# Patient Record
Sex: FEMALE | Race: WHITE | NOT HISPANIC OR LATINO | Employment: OTHER | URBAN - METROPOLITAN AREA
[De-identification: names, ages, dates, MRNs, and addresses within clinical notes are randomized per-mention and may not be internally consistent; named-entity substitution may affect disease eponyms.]

---

## 2017-04-26 ENCOUNTER — APPOINTMENT (OUTPATIENT)
Dept: WOMENS IMAGING | Facility: HOSPITAL | Age: 52
End: 2017-04-26

## 2017-04-26 PROCEDURE — 76641 ULTRASOUND BREAST COMPLETE: CPT | Performed by: RADIOLOGY

## 2017-04-26 PROCEDURE — 19001 PUNCTURE ASPIR CYST BRST EA: CPT | Performed by: RADIOLOGY

## 2017-04-26 PROCEDURE — 77067 SCR MAMMO BI INCL CAD: CPT | Performed by: RADIOLOGY

## 2017-04-26 PROCEDURE — 77063 BREAST TOMOSYNTHESIS BI: CPT | Performed by: RADIOLOGY

## 2017-04-26 PROCEDURE — 19000 PUNCTURE ASPIR CYST BREAST: CPT | Performed by: RADIOLOGY

## 2017-04-26 PROCEDURE — 76942 ECHO GUIDE FOR BIOPSY: CPT | Performed by: RADIOLOGY

## 2017-04-26 PROCEDURE — G0202 SCR MAMMO BI INCL CAD: HCPCS | Performed by: RADIOLOGY

## 2017-04-26 PROCEDURE — MDREVIEWSP: Performed by: RADIOLOGY

## 2018-02-02 ENCOUNTER — OFFICE VISIT (OUTPATIENT)
Dept: INTERNAL MEDICINE | Facility: CLINIC | Age: 53
End: 2018-02-02

## 2018-02-02 VITALS
WEIGHT: 156.4 LBS | BODY MASS INDEX: 26.06 KG/M2 | DIASTOLIC BLOOD PRESSURE: 80 MMHG | SYSTOLIC BLOOD PRESSURE: 120 MMHG | OXYGEN SATURATION: 99 % | RESPIRATION RATE: 16 BRPM | TEMPERATURE: 98.1 F | HEIGHT: 65 IN | HEART RATE: 113 BPM

## 2018-02-02 DIAGNOSIS — G43.009 MIGRAINE WITHOUT AURA AND WITHOUT STATUS MIGRAINOSUS, NOT INTRACTABLE: ICD-10-CM

## 2018-02-02 DIAGNOSIS — R42 VERTIGO: Primary | ICD-10-CM

## 2018-02-02 PROBLEM — G44.59 OTHER COMPLICATED HEADACHE SYNDROME: Status: ACTIVE | Noted: 2018-02-02

## 2018-02-02 PROCEDURE — 99213 OFFICE O/P EST LOW 20 MIN: CPT | Performed by: NURSE PRACTITIONER

## 2018-02-02 RX ORDER — MECLIZINE HYDROCHLORIDE 25 MG/1
25 TABLET ORAL 3 TIMES DAILY PRN
Status: DISCONTINUED | OUTPATIENT
Start: 2018-02-02 | End: 2018-02-02

## 2018-02-02 RX ORDER — MECLIZINE HYDROCHLORIDE 25 MG/1
25 TABLET ORAL 3 TIMES DAILY PRN
Qty: 30 TABLET | Refills: 1 | Status: SHIPPED | OUTPATIENT
Start: 2018-02-02 | End: 2018-04-26 | Stop reason: SDUPTHER

## 2018-02-02 NOTE — PROGRESS NOTES
Subjective   Patient ID: Saray Green is a 52 y.o. female presents with   Chief Complaint   Patient presents with   • Dizziness     Pt has been experincing dizzy spells when she is laying down or moving head to bend down       HPI Comments: Cc: freq episodes of vertigo followed by frontal ha x 6-8 weeks. She has seen dr mar in the past for migraines and would like f/u appt. Her father  from cerebral aneurysm    Dizziness   Associated symptoms include headaches (frontal and throbbing relieved by IBU. duration 30min precipitated by vertigo). Pertinent negatives include no abdominal pain, fatigue, joint swelling, numbness, rash or sore throat.       Allergies   Allergen Reactions   • Macrobid [Nitrofurantoin] Rash       The following portions of the patient's history were reviewed and updated as appropriate: allergies, current medications, past family history, past medical history, past social history, past surgical history and problem list.      Review of Systems   Constitutional: Negative for activity change, fatigue and unexpected weight change.   HENT: Negative for dental problem, ear pain, nosebleeds and sore throat.    Eyes: Negative for pain and discharge.   Respiratory: Negative for chest tightness, shortness of breath and wheezing.    Gastrointestinal: Negative for abdominal pain and blood in stool.   Endocrine: Negative.    Genitourinary: Negative for difficulty urinating and hematuria.   Musculoskeletal: Negative for joint swelling.   Skin: Negative for color change, pallor, rash and wound.   Allergic/Immunologic: Negative.    Neurological: Positive for dizziness and headaches (frontal and throbbing relieved by IBU. duration 30min precipitated by vertigo). Negative for tremors, seizures, syncope, facial asymmetry, speech difficulty and numbness.   Hematological: Negative for adenopathy.   Psychiatric/Behavioral: Negative for agitation, confusion, sleep disturbance and suicidal ideas.        Objective     Vitals:    02/02/18 0859   BP: 120/80   Pulse: 113   Resp: 16   Temp: 98.1 °F (36.7 °C)   SpO2: 99%         Physical Exam   Constitutional: She is oriented to person, place, and time. She appears well-developed and well-nourished. No distress.   HENT:   Head: Normocephalic and atraumatic.   Eyes: Conjunctivae and EOM are normal. Pupils are equal, round, and reactive to light.   Neck: Normal range of motion. Neck supple.   Cardiovascular: Normal rate, regular rhythm, normal heart sounds and intact distal pulses.    Pulmonary/Chest: Effort normal and breath sounds normal.   Abdominal: Soft. Bowel sounds are normal.   Musculoskeletal: Normal range of motion.   Neurological: She is alert and oriented to person, place, and time. She has normal reflexes. No cranial nerve deficit. Coordination normal.   Speech clear and appropriate response and speech. Rhomberg neg. Coordination is intact   Skin: Skin is warm and dry.   Psychiatric: She has a normal mood and affect. Her behavior is normal. Judgment and thought content normal.   Nursing note and vitals reviewed.        Saray Cadet was seen today for dizziness.    Diagnoses and all orders for this visit:    Vertigo  -     meclizine (ANTIVERT) tablet 25 mg; Take 1 tablet by mouth 3 (Three) Times a Day As Needed for dizziness.  -     Ambulatory Referral to Neurology    Migraine without aura and without status migrainosus, not intractable  -     Ambulatory Referral to Neurology        Follow-up with Primary Care Physician in 24-48 hours if these symptoms worsen or fail to improve as anticipated.

## 2018-02-12 DIAGNOSIS — G43.009 MIGRAINE WITHOUT AURA AND WITHOUT STATUS MIGRAINOSUS, NOT INTRACTABLE: ICD-10-CM

## 2018-02-12 DIAGNOSIS — R42 VERTIGO: Primary | ICD-10-CM

## 2018-02-20 ENCOUNTER — HOSPITAL ENCOUNTER (OUTPATIENT)
Dept: MRI IMAGING | Facility: HOSPITAL | Age: 53
Discharge: HOME | End: 2018-02-20
Admitting: NURSE PRACTITIONER

## 2018-02-20 DIAGNOSIS — R42 VERTIGO: ICD-10-CM

## 2018-02-20 DIAGNOSIS — G43.009 MIGRAINE WITHOUT AURA AND WITHOUT STATUS MIGRAINOSUS, NOT INTRACTABLE: ICD-10-CM

## 2018-02-20 PROCEDURE — A9577 INJ MULTIHANCE: HCPCS | Performed by: NURSE PRACTITIONER

## 2018-02-20 PROCEDURE — 70553 MRI BRAIN STEM W/O & W/DYE: CPT

## 2018-02-20 PROCEDURE — 0 GADOBENATE DIMEGLUMINE 529 MG/ML SOLUTION: Performed by: NURSE PRACTITIONER

## 2018-02-20 PROCEDURE — 82565 ASSAY OF CREATININE: CPT

## 2018-02-20 RX ADMIN — GADOBENATE DIMEGLUMINE 13 ML: 529 INJECTION, SOLUTION INTRAVENOUS at 09:15

## 2018-02-21 LAB — CREAT BLDA-MCNC: 0.9 MG/DL (ref 0.6–1.3)

## 2018-02-22 ENCOUNTER — TELEPHONE (OUTPATIENT)
Dept: INTERNAL MEDICINE | Facility: CLINIC | Age: 53
End: 2018-02-22

## 2018-02-22 DIAGNOSIS — R41.0 CONFUSION: Primary | ICD-10-CM

## 2018-02-27 ENCOUNTER — RESULTS ENCOUNTER (OUTPATIENT)
Dept: INTERNAL MEDICINE | Facility: CLINIC | Age: 53
End: 2018-02-27

## 2018-02-27 DIAGNOSIS — R41.0 CONFUSION: ICD-10-CM

## 2018-03-17 LAB
FOLATE SERPL-MCNC: 5.2 NG/ML
T4 SERPL-MCNC: 7.3 UG/DL (ref 4.5–12)
TSH SERPL DL<=0.005 MIU/L-ACNC: 1.56 UIU/ML (ref 0.45–4.5)
VIT B12 SERPL-MCNC: 220 PG/ML (ref 232–1245)

## 2018-03-26 ENCOUNTER — OFFICE VISIT (OUTPATIENT)
Dept: NEUROLOGY | Facility: CLINIC | Age: 53
End: 2018-03-26

## 2018-03-26 VITALS
DIASTOLIC BLOOD PRESSURE: 72 MMHG | HEIGHT: 66 IN | SYSTOLIC BLOOD PRESSURE: 117 MMHG | OXYGEN SATURATION: 98 % | HEART RATE: 100 BPM | WEIGHT: 158 LBS | BODY MASS INDEX: 25.39 KG/M2

## 2018-03-26 DIAGNOSIS — E53.8 VITAMIN B12 DEFICIENCY: ICD-10-CM

## 2018-03-26 DIAGNOSIS — R42 VERTIGO: Primary | ICD-10-CM

## 2018-03-26 DIAGNOSIS — Z82.49 FAMILY HISTORY OF ANEURYSM: ICD-10-CM

## 2018-03-26 DIAGNOSIS — G43.009 MIGRAINE WITHOUT AURA AND WITHOUT STATUS MIGRAINOSUS, NOT INTRACTABLE: ICD-10-CM

## 2018-03-26 PROCEDURE — 99204 OFFICE O/P NEW MOD 45 MIN: CPT | Performed by: PSYCHIATRY & NEUROLOGY

## 2018-03-26 NOTE — PROGRESS NOTES
Subjective:     Patient ID: Saray Green is a 52 y.o. female.    History of Present Illness  {Common ambulatory SmartLinks:48095}    Review of Systems     Objective:    Neurologic Exam    Physical Exam    Assessment/Plan:     {Assess/PlanSmartLinks:79911}

## 2018-03-26 NOTE — PROGRESS NOTES
Subjective:     Patient ID: Saray Green is a 52 y.o. female.    History of Present Illness  The following portions of the patient's history were reviewed and updated as appropriate: allergies, current medications, past family history, past medical history, past social history, past surgical history and problem list.  Vertigo: Dr. Schumacher, : Dizziness with lying down or moving her head.  Frequent vertigo followed by a frontal headache for 6-8 weeks.  Brain MRI was unremarkable and I reviewed the x-ray.  Meclizine has dramatically helped the symptoms over time.  The symptoms probably began [place]    Family history of aneurysm: Father  of cerebral aneurysm.  Care of him many years ago.  She has no other family history of this disorder.    Speech concerns: Also since [place] she notes occasionally mixing up words.  For example might say [place]  , might say 'comfortable' instead of 'expensive'.  This seems to occur every day.  There is no aphasia.    No other neurologic symptoms such as diplopia or loss weakness numbness loss of balance.  She has a low B12 level    MRI brain review-2018-normal study with and without contrast.    MRI  Of spine review from : Cervical scan and THoracic  showed no stenosis with normal cord, small protrusions at T8-9 and T9-10.  Cervical normal.  The lumbar MRI same date showed disc bulge at L4-5 with an annular tear, small protrusion at L5-S1 and mild arthritic phenomena.  Very little change compared to  except that the bulge was more prominent    Lab review from 2018 revealed a normal TSH and folate but low B12 at 220  Review of Systems   Constitutional: Negative.    HENT: Negative.    Eyes: Negative.    Respiratory: Negative.    Cardiovascular: Negative.    Gastrointestinal: Negative.    Endocrine: Negative.    Genitourinary: Negative.    Musculoskeletal: Positive for back pain.   Skin: Negative.    Allergic/Immunologic: Negative.     Neurological: Positive for dizziness and speech difficulty. Negative for tremors, seizures, syncope, facial asymmetry, weakness, light-headedness, numbness and headaches.   Hematological: Negative.    Psychiatric/Behavioral: Negative for agitation, behavioral problems, confusion, decreased concentration, dysphoric mood, hallucinations, self-injury, sleep disturbance and suicidal ideas. The patient is not nervous/anxious and is not hyperactive.         Objective:    Neurologic Exam  This patient was well-developed, well-nourished and in no acute distress.      GAIT:  Gait, station, heel, toe and tandem walk were normal.  There was no drift of the arms.  Romberg’s sign was not present.      VASCULAR: The carotid arteries had normal upstroke to palpation without bruits.  The vertebral arteries were without bruits.  The radial pulses were equal and without delay. The extremities were without cyanosis, clubbing or edema.    MENTAL STATUS: This patient was alert and oriented to person, place, time and situation.  Recent and remote memory -  intact.  Attention span, fund of knowledge and concentration were normal.  Comprehension, naming, reading, repetition, and language were normal.  Fund of knowledge was intact.    CRANIAL NERVES:  Olfaction-not tested.  Visual fields full in all quadrants to confrontation.  The pupils were equally round and reactive to light at  6  mm.  There was no evidence of a Dale Yousuf pupil.  Funduscopic exam revealed no papilledema, hemorrhage or exudate.  The gaze was conjugate. The vertical and horizontal eye movements were full without nystagmus.  There was no facial weakness.  There was no facial sensory loss to pinprick bilaterally.  Hearing was normal for light finger rub and casual speech.  Speech is normal without dysarthria.  The neck is supple and strength is normal in rotation, flexion and extension.  Tongue and palate movements are normal.    MOTOR UPPER EXTREMTIES:   Bilaterally  the deltoid, biceps, triceps, wrist extensors, intrinsic hand muscles, and  were grade 5 and normal.  Bulk, tone and strength of these muscles were normal without abnormal movements.    MOTOR LOWER EXTREMITIES: Bilaterally the  grade 5 & normal. Bulk, tone and strength of these muscles were normal without abnormal movements.  DEEP TENDON REFLEXES:  Bilateral biceps, triceps, and brachioradialis reflexes were grade 1 symmetric.  Bilateral knee, hamstrings and ankle reflexes were grade 2 and symmetric.  The plantar responses were downgoing.  Ankle clonus was not present.    CEREBELLAR:  Finger to nose, rapid finger opposition, and heel-to-shin tests were performed normally, bilaterally.    MUSCULOSKELETAL:  Normal range of motion of the neck is noted.  There was no back pain with straight leg raise.  Internal and external rotation of the hips was normal.     SENSORY: There was normal upper and lower extremity sensation to vibration, proprioception, and pinprick.  HIGHER CORTICAL FUNCTION: There were no aphasic or apraxic errors.  Visual fields were intact to confrontation.      Physical Exam   Constitutional: She appears well-developed.   HENT:   Head: Normocephalic.   Neck: Normal range of motion.   Cardiovascular: Normal rate.    Pulmonary/Chest: Effort normal.   Psychiatric: She has a normal mood and affect. Her behavior is normal. Judgment and thought content normal.   Nursing note and vitals reviewed.      Assessment/Plan:       Problems Addressed this Visit        Unprioritized    Vertigo - Primary    Relevant Orders    MRI Angiogram Neck With & Without Contrast    Migraine without aura and without status migrainosus, not intractable    Family history of aneurysm    Relevant Orders    MRI Angiogram Head With & Without Contrast      Other Visit Diagnoses    None.        Vertigo likely is due to BPPV  .  I gave her a handout concerning this disorder and on how to do the Epley exercises.  I suspect the problem  will resolve on its own.    Family history of aneurysm-her father  of this disorder.  She needs the MRA study of the head which will be ordered.      Speech difficulty-no symptoms noted today.  We will add the MRA of the carotids to rule out severe stenosis    B12 deficiency-she will contact Dr. Elizalde referred.  I explained that she may have deficiency of intrinsic factor.  Low B12 can cause a variety of neurologic and hematologic symptoms.  She has not had a CBC or MMA level.

## 2018-03-26 NOTE — PATIENT INSTRUCTIONS
How to Perform the Epley Maneuver  The Epley maneuver is an exercise that relieves symptoms of vertigo. Vertigo is the feeling that you or your surroundings are moving when they are not. When you feel vertigo, you may feel like the room is spinning and have trouble walking. Dizziness is a little different than vertigo. When you are dizzy, you may feel unsteady or light-headed.  You can do this maneuver at home whenever you have symptoms of vertigo. You can do it up to 3 times a day until your symptoms go away.  Even though the Epley maneuver may relieve your vertigo for a few weeks, it is possible that your symptoms will return. This maneuver relieves vertigo, but it does not relieve dizziness.  What are the risks?  If it is done correctly, the Epley maneuver is considered safe. Sometimes it can lead to dizziness or nausea that goes away after a short time. If you develop other symptoms, such as changes in vision, weakness, or numbness, stop doing the maneuver and call your health care provider.  How to perform the Epley maneuver  1. Sit on the edge of a bed or table with your back straight and your legs extended or hanging over the edge of the bed or table.  2. Turn your head half-way toward the affected ear or side.  3. Lie backward quickly with your head turned until you are lying flat on your back. You may want to position a pillow under your shoulders.  4. Hold this position for 30 seconds. You may experience an attack of vertigo. This is normal.  5. Turn your head to the opposite direction until your unaffected ear is facing the floor.  6. Hold this position for 30 seconds. You may experience an attack of vertigo. This is normal. Hold this position until the vertigo stops.  7. Turn your whole body to the same side as your head. Hold for another 30 seconds.  8. Sit back up.  You can repeat this exercise up to 3 times a day.  Follow these instructions at home:  · After doing the Epley maneuver, you can return to  your normal activities.  · Ask your health care provider if there is anything you should do at home to prevent vertigo. He or she may recommend that you:  ¨ Keep your head raised (elevated) with two or more pillows while you sleep.  ¨ Do not sleep on the side of your affected ear.  ¨ Get up slowly from bed.  ¨ Avoid sudden movements during the day.  ¨ Avoid extreme head movement, like looking up or bending over.  Contact a health care provider if:  · Your vertigo gets worse.  · You have other symptoms, including:  ¨ Nausea.  ¨ Vomiting.  ¨ Headache.  Get help right away if:  · You have vision changes.  · You have a severe or worsening headache or neck pain.  · You cannot stop vomiting.  · You have new numbness or weakness in any part of your body.  Summary  · Vertigo is the feeling that you or your surroundings are moving when they are not.  · The Epley maneuver is an exercise that relieves symptoms of vertigo.  · If the Epley maneuver is done correctly, it is considered safe. You can do it up to 3 times a day.  This information is not intended to replace advice given to you by your health care provider. Make sure you discuss any questions you have with your health care provider.  Document Released: 12/23/2014 Document Revised: 11/07/2017 Document Reviewed: 11/07/2017  Elsevier Interactive Patient Education © 2017 [employer]  Benign Positional Vertigo  Vertigo is the feeling that you or your surroundings are moving when they are not. Benign positional vertigo is the most common form of vertigo. The cause of this condition is not serious (is benign). This condition is triggered by certain movements and positions (is positional). This condition can be dangerous if it occurs while you are doing something that could endanger you or others, such as driving.  What are the causes?  In many cases, the cause of this condition is not known. It may be caused by a disturbance in an area of the inner ear that helps your brain  to sense movement and balance. This disturbance can be caused by a viral infection (labyrinthitis), head injury, or repetitive motion.  What increases the risk?  This condition is more likely to develop in:  · Women.  · People who are 50 years of age or older.  What are the signs or symptoms?  Symptoms of this condition usually happen when you move your head or your eyes in different directions. Symptoms may start suddenly, and they usually last for less than a minute. Symptoms may include:  · Loss of balance and falling.  · Feeling like you are spinning or moving.  · Feeling like your surroundings are spinning or moving.  · Nausea and vomiting.  · Blurred vision.  · Dizziness.  · Involuntary eye movement (nystagmus).  Symptoms can be mild and cause only slight annoyance, or they can be severe and interfere with daily life. Episodes of benign positional vertigo may return (recur) over time, and they may be triggered by certain movements. Symptoms may improve over time.  How is this diagnosed?  This condition is usually diagnosed by medical history and a physical exam of the head, neck, and ears. You may be referred to a health care provider who specializes in ear, nose, and throat (ENT) problems (otolaryngologist) or a provider who specializes in disorders of the nervous system (neurologist). You may have additional testing, including:  · MRI.  · A CT scan.  · Eye movement tests. Your health care provider may ask you to change positions quickly while he or she watches you for symptoms of benign positional vertigo, such as nystagmus. Eye movement may be tested with an electronystagmogram (ENG), caloric stimulation, the [place]-Hallpike test, or the roll test.  · An electroencephalogram (EEG). This records electrical activity in your brain.  · Hearing tests.  How is this treated?  Usually, your health care provider will treat this by moving your head in specific positions to adjust your inner ear back to normal. Surgery  may be needed in severe cases, but this is rare. In some cases, benign positional vertigo may resolve on its own in 2-4 weeks.  Follow these instructions at home:  Safety   · Move slowly.Avoid sudden body or head movements.  · Avoid driving.  · Avoid operating heavy machinery.  · Avoid doing any tasks that would be dangerous to you or others if a vertigo episode would occur.  · If you have trouble walking or keeping your balance, try using a cane for stability. If you feel dizzy or unstable, sit down right away.  · Return to your normal activities as told by your health care provider. Ask your health care provider what activities are safe for you.  General instructions   · Take over-the-counter and prescription medicines only as told by your health care provider.  · Avoid certain positions or movements as told by your health care provider.  · Drink enough fluid to keep your urine clear or pale yellow.  · Keep all follow-up visits as told by your health care provider. This is important.  Contact a health care provider if:  · You have a fever.  · Your condition gets worse or you develop new symptoms.  · Your family or friends notice any behavioral changes.  · Your nausea or vomiting gets worse.  · You have numbness or a “pins and needles” sensation.  Get help right away if:  · You have difficulty speaking or moving.  · You are always dizzy.  · You faint.  · You develop severe headaches.  · You have weakness in your legs or arms.  · You have changes in your hearing or vision.  · You develop a stiff neck.  · You develop sensitivity to light.  This information is not intended to replace advice given to you by your health care provider. Make sure you discuss any questions you have with your health care provider.  Document Released: 09/25/2007 Document Revised: 05/25/2017 Document Reviewed: 04/11/2016  [employer] Interactive Patient Education © 2017 [employer] Inc.

## 2018-04-09 ENCOUNTER — HOSPITAL ENCOUNTER (OUTPATIENT)
Dept: MRI IMAGING | Facility: HOSPITAL | Age: 53
Discharge: HOME | End: 2018-04-09
Attending: PSYCHIATRY & NEUROLOGY

## 2018-04-09 ENCOUNTER — HOSPITAL ENCOUNTER (OUTPATIENT)
Dept: MRI IMAGING | Facility: HOSPITAL | Age: 53
Discharge: HOME | End: 2018-04-09
Attending: PSYCHIATRY & NEUROLOGY | Admitting: PSYCHIATRY & NEUROLOGY

## 2018-04-09 DIAGNOSIS — Z82.49 FAMILY HISTORY OF ANEURYSM: ICD-10-CM

## 2018-04-09 DIAGNOSIS — R42 VERTIGO: ICD-10-CM

## 2018-04-09 PROCEDURE — 70544 MR ANGIOGRAPHY HEAD W/O DYE: CPT

## 2018-04-09 PROCEDURE — A9577 INJ MULTIHANCE: HCPCS | Performed by: PSYCHIATRY & NEUROLOGY

## 2018-04-09 PROCEDURE — 70549 MR ANGIOGRAPH NECK W/O&W/DYE: CPT

## 2018-04-09 PROCEDURE — 0 GADOBENATE DIMEGLUMINE 529 MG/ML SOLUTION: Performed by: PSYCHIATRY & NEUROLOGY

## 2018-04-09 PROCEDURE — 82565 ASSAY OF CREATININE: CPT

## 2018-04-09 RX ADMIN — GADOBENATE DIMEGLUMINE 15 ML: 529 INJECTION, SOLUTION INTRAVENOUS at 15:45

## 2018-04-10 LAB — CREAT BLDA-MCNC: 0.9 MG/DL (ref 0.6–1.3)

## 2018-04-26 ENCOUNTER — OFFICE VISIT (OUTPATIENT)
Dept: INTERNAL MEDICINE | Facility: CLINIC | Age: 53
End: 2018-04-26

## 2018-04-26 VITALS
OXYGEN SATURATION: 99 % | DIASTOLIC BLOOD PRESSURE: 60 MMHG | HEART RATE: 70 BPM | HEIGHT: 66 IN | TEMPERATURE: 97.4 F | RESPIRATION RATE: 16 BRPM | SYSTOLIC BLOOD PRESSURE: 122 MMHG | WEIGHT: 159.6 LBS | BODY MASS INDEX: 25.65 KG/M2

## 2018-04-26 DIAGNOSIS — E78.01 FAMILIAL HYPERCHOLESTEROLEMIA: Primary | ICD-10-CM

## 2018-04-26 DIAGNOSIS — E87.6 DIURETIC-INDUCED HYPOKALEMIA: ICD-10-CM

## 2018-04-26 DIAGNOSIS — T50.2X5A DIURETIC-INDUCED HYPOKALEMIA: ICD-10-CM

## 2018-04-26 DIAGNOSIS — R53.82 CHRONIC FATIGUE: ICD-10-CM

## 2018-04-26 DIAGNOSIS — R42 VERTIGO: ICD-10-CM

## 2018-04-26 DIAGNOSIS — E53.8 B12 DEFICIENCY: ICD-10-CM

## 2018-04-26 PROBLEM — E78.5 HYPERLIPIDEMIA: Status: ACTIVE | Noted: 2018-04-26

## 2018-04-26 PROCEDURE — 99213 OFFICE O/P EST LOW 20 MIN: CPT | Performed by: FAMILY MEDICINE

## 2018-04-26 RX ORDER — TRIAMTERENE AND HYDROCHLOROTHIAZIDE 37.5; 25 MG/1; MG/1
1 CAPSULE ORAL EVERY MORNING
Qty: 30 CAPSULE | Refills: 5 | Status: SHIPPED | OUTPATIENT
Start: 2018-04-26 | End: 2018-07-17

## 2018-04-26 RX ORDER — MECLIZINE HYDROCHLORIDE 25 MG/1
25 TABLET ORAL 3 TIMES DAILY PRN
Qty: 90 TABLET | Refills: 1 | Status: SHIPPED | OUTPATIENT
Start: 2018-04-26 | End: 2018-04-26

## 2018-04-26 RX ORDER — MECLIZINE HYDROCHLORIDE 25 MG/1
25 TABLET ORAL 3 TIMES DAILY PRN
Qty: 90 TABLET | Refills: 1 | Status: SHIPPED | OUTPATIENT
Start: 2018-04-26 | End: 2018-07-17

## 2018-04-26 NOTE — PROGRESS NOTES
CC:vertigo,B-12 defieincy    Subjective.../HPI  Patient present today with still with nvertigo-not positional  Taking extra b_12  I have reviewed the patient's medical history in detail and updated the computerized patient record.    Past Medical History:   Diagnosis Date   • Cardiomyopathy     0CT 1999   OFF MEDS AFTER RESOLVED   HOLTER MONITOR APRIL 2016   • Heart palpitations     COMES AND GOES  24 HOLTER DONE 4-13-16   NO RESULTS YET   • History of migraine    • Migraine    • Seasonal allergies    • Shoulder pain, right        Past Surgical History:   Procedure Laterality Date   • D&C HYSTEROSCOPY WITH NOVASURE ENDOMETRIAL ABLATION AND MYOSURE     • DILATATION AND CURETTAGE      COUPLE   • KNEE ARTHROSCOPY      AGE 11   • SHOULDER ACROMIOPLASTY Left     X 2   • SHOULDER ARTHROSCOPY Right 4/28/2016    Procedure: RT SHOULDER ARTHROSCOPY WITH ACROMIOPLSTY, LABRAL DEBRIDEMENT  AND DISTAL CLAVICLE EXCISION;  Surgeon: Rikki Morel MD;  Location: [facility] OR [facility] [place];  Service:        Family History   Problem Relation Age of Onset   • Hypertension Mother    • Diabetes Mother    • Cancer Mother    • Thyroid disease Mother    • Hypertension Father    • Aneurysm Father    • Stroke Paternal Grandfather    • Cancer Paternal Grandfather        Social History     Social History   • Marital status:      Spouse name: N/A   • Number of children: N/A   • Years of education: N/A     Occupational History   • Not on file.     Social History Main Topics   • Smoking status: Never Smoker   • Smokeless tobacco: Never Used   • Alcohol use Yes      Comment: SELDOM   • Drug use: No   • Sexual activity: Not on file     Other Topics Concern   • Not on file     Social History Narrative   • No narrative on file         There is no immunization history on file for this patient.    Review of Systems:   Review of Systems   Constitutional: Negative.    HENT: Negative.    Eyes: Negative.    Respiratory: Negative.    Cardiovascular: Negative.   "  Gastrointestinal: Negative.    Endocrine: Negative.    Genitourinary: Negative.    Musculoskeletal: Negative.    Skin: Negative.    Allergic/Immunologic: Negative.    Neurological: Negative.    Hematological: Negative.    Psychiatric/Behavioral: Negative.          Physical Exam   Constitutional: She is oriented to person, place, and time. She appears well-developed and well-nourished.   Cardiovascular: Normal rate, regular rhythm and normal heart sounds.    Pulmonary/Chest: Effort normal and breath sounds normal.   Neurological: She is alert and oriented to person, place, and time.   Psychiatric: She has a normal mood and affect. Her behavior is normal.   Vitals reviewed.        Vital Signs     Vitals:    04/26/18 1125   BP: 122/60   BP Location: Left arm   Patient Position: Sitting   Cuff Size: Adult   Pulse: 70   Resp: 16   Temp: 97.4 °F (36.3 °C)   TempSrc: Tympanic   SpO2: 99%   Weight: 72.4 kg (159 lb 9.6 oz)   Height: 167.6 cm (66\")          Results Review:      REVIEWED AND DISCUSSED CLINICAL RESULTS WITH PATIENT      Requested Prescriptions     Signed Prescriptions Disp Refills   • meclizine (ANTIVERT) 25 MG tablet 90 tablet 1     Sig: Take 1 tablet by mouth 3 (Three) Times a Day As Needed for dizziness.         Current Outpatient Prescriptions:   •  acetaminophen (TYLENOL) 500 MG tablet, Take 500 mg by mouth every 6 (six) hours as needed for mild pain (1-3)., Disp: , Rfl:   •  meclizine (ANTIVERT) 25 MG tablet, Take 1 tablet by mouth 3 (Three) Times a Day As Needed for dizziness., Disp: 90 tablet, Rfl: 1    Procedures          There are no diagnoses linked to this encounter.     No Follow-up on file.    Luis Fleming M.D  04/26/18  11:40 AM          "

## 2018-04-27 ENCOUNTER — APPOINTMENT (OUTPATIENT)
Dept: WOMENS IMAGING | Facility: HOSPITAL | Age: 53
End: 2018-04-27

## 2018-04-27 LAB
ALBUMIN SERPL-MCNC: 4.3 G/DL (ref 3.5–5.2)
ALBUMIN/GLOB SERPL: 1.6 G/DL
ALP SERPL-CCNC: 59 U/L (ref 39–117)
ALT SERPL-CCNC: 12 U/L (ref 1–33)
AST SERPL-CCNC: 15 U/L (ref 1–32)
BASOPHILS # BLD AUTO: 0.03 10*3/MM3 (ref 0–0.2)
BASOPHILS NFR BLD AUTO: 0.4 % (ref 0–1.5)
BILIRUB SERPL-MCNC: 0.5 MG/DL (ref 0.1–1.2)
BUN SERPL-MCNC: 17 MG/DL (ref 6–20)
BUN/CREAT SERPL: 18.5 (ref 7–25)
CALCIUM SERPL-MCNC: 9.7 MG/DL (ref 8.6–10.5)
CHLORIDE SERPL-SCNC: 101 MMOL/L (ref 98–107)
CHOLEST SERPL-MCNC: 237 MG/DL (ref 0–200)
CO2 SERPL-SCNC: 26.5 MMOL/L (ref 22–29)
CREAT SERPL-MCNC: 0.92 MG/DL (ref 0.57–1)
EOSINOPHIL # BLD AUTO: 0.16 10*3/MM3 (ref 0–0.7)
EOSINOPHIL NFR BLD AUTO: 2.2 % (ref 0.3–6.2)
ERYTHROCYTE [DISTWIDTH] IN BLOOD BY AUTOMATED COUNT: 12.5 % (ref 11.7–13)
GFR SERPLBLD CREATININE-BSD FMLA CKD-EPI: 64 ML/MIN/1.73
GFR SERPLBLD CREATININE-BSD FMLA CKD-EPI: 77 ML/MIN/1.73
GLOBULIN SER CALC-MCNC: 2.7 GM/DL
GLUCOSE SERPL-MCNC: 83 MG/DL (ref 65–99)
HCT VFR BLD AUTO: 45.9 % (ref 35.6–45.5)
HDLC SERPL-MCNC: 55 MG/DL (ref 40–60)
HGB BLD-MCNC: 15.4 G/DL (ref 11.9–15.5)
IMM GRANULOCYTES # BLD: 0.02 10*3/MM3 (ref 0–0.03)
IMM GRANULOCYTES NFR BLD: 0.3 % (ref 0–0.5)
LDLC SERPL CALC-MCNC: 154 MG/DL (ref 0–100)
LDLC/HDLC SERPL: 2.79 {RATIO}
LYMPHOCYTES # BLD AUTO: 2.53 10*3/MM3 (ref 0.9–4.8)
LYMPHOCYTES NFR BLD AUTO: 34.7 % (ref 19.6–45.3)
MCH RBC QN AUTO: 32.2 PG (ref 26.9–32)
MCHC RBC AUTO-ENTMCNC: 33.6 G/DL (ref 32.4–36.3)
MCV RBC AUTO: 96 FL (ref 80.5–98.2)
MONOCYTES # BLD AUTO: 0.58 10*3/MM3 (ref 0.2–1.2)
MONOCYTES NFR BLD AUTO: 7.9 % (ref 5–12)
NEUTROPHILS # BLD AUTO: 4 10*3/MM3 (ref 1.9–8.1)
NEUTROPHILS NFR BLD AUTO: 54.8 % (ref 42.7–76)
PLATELET # BLD AUTO: 307 10*3/MM3 (ref 140–500)
POTASSIUM SERPL-SCNC: 5 MMOL/L (ref 3.5–5.2)
PROT SERPL-MCNC: 7 G/DL (ref 6–8.5)
RBC # BLD AUTO: 4.78 10*6/MM3 (ref 3.9–5.2)
SODIUM SERPL-SCNC: 140 MMOL/L (ref 136–145)
TRIGL SERPL-MCNC: 142 MG/DL (ref 0–150)
VIT B12 SERPL-MCNC: 255 PG/ML (ref 211–946)
VLDLC SERPL CALC-MCNC: 28.4 MG/DL (ref 5–40)
WBC # BLD AUTO: 7.3 10*3/MM3 (ref 4.5–10.7)

## 2018-04-27 PROCEDURE — 77063 BREAST TOMOSYNTHESIS BI: CPT | Performed by: RADIOLOGY

## 2018-04-27 PROCEDURE — MDREVIEWSP: Performed by: RADIOLOGY

## 2018-04-27 PROCEDURE — 76641 ULTRASOUND BREAST COMPLETE: CPT | Performed by: RADIOLOGY

## 2018-04-27 PROCEDURE — 77067 SCR MAMMO BI INCL CAD: CPT | Performed by: RADIOLOGY

## 2018-05-01 ENCOUNTER — RESULTS ENCOUNTER (OUTPATIENT)
Dept: INTERNAL MEDICINE | Facility: CLINIC | Age: 53
End: 2018-05-01

## 2018-05-01 DIAGNOSIS — T50.2X5A DIURETIC-INDUCED HYPOKALEMIA: ICD-10-CM

## 2018-05-01 DIAGNOSIS — E87.6 DIURETIC-INDUCED HYPOKALEMIA: ICD-10-CM

## 2018-07-17 ENCOUNTER — OFFICE VISIT (OUTPATIENT)
Dept: INTERNAL MEDICINE | Facility: CLINIC | Age: 53
End: 2018-07-17

## 2018-07-17 VITALS
BODY MASS INDEX: 24.59 KG/M2 | HEIGHT: 66 IN | TEMPERATURE: 98.1 F | HEART RATE: 84 BPM | DIASTOLIC BLOOD PRESSURE: 79 MMHG | OXYGEN SATURATION: 98 % | SYSTOLIC BLOOD PRESSURE: 117 MMHG | WEIGHT: 153 LBS | RESPIRATION RATE: 16 BRPM

## 2018-07-17 DIAGNOSIS — M19.071 PRIMARY OSTEOARTHRITIS OF BOTH FEET: ICD-10-CM

## 2018-07-17 DIAGNOSIS — F42.2 MIXED OBSESSIONAL THOUGHTS AND ACTS: Primary | ICD-10-CM

## 2018-07-17 DIAGNOSIS — M19.072 PRIMARY OSTEOARTHRITIS OF BOTH FEET: ICD-10-CM

## 2018-07-17 PROBLEM — M19.079 OSTEOARTHRITIS OF FOOT: Status: ACTIVE | Noted: 2018-07-17

## 2018-07-17 PROCEDURE — 99213 OFFICE O/P EST LOW 20 MIN: CPT | Performed by: FAMILY MEDICINE

## 2018-07-17 RX ORDER — ESCITALOPRAM OXALATE 10 MG/1
10 TABLET ORAL DAILY
Qty: 30 TABLET | Refills: 5 | Status: SHIPPED | OUTPATIENT
Start: 2018-07-17 | End: 2019-01-29

## 2018-07-17 RX ORDER — ESCITALOPRAM OXALATE 10 MG/1
10 TABLET ORAL DAILY
Qty: 30 TABLET | Refills: 5 | Status: SHIPPED | OUTPATIENT
Start: 2018-07-17 | End: 2018-07-17

## 2018-07-17 RX ORDER — MELOXICAM 7.5 MG/1
7.5 TABLET ORAL DAILY
Refills: 1 | COMMUNITY
Start: 2018-06-22 | End: 2018-07-17

## 2018-07-17 NOTE — PROGRESS NOTES
CC:vertigo,arthritis.depression    Subjective.../HPI  Patient present today with1)depression-anxiety 2)arthritis-    I have reviewed the patient's medical history in detail and updated the computerized patient record.    Past Medical History:   Diagnosis Date   • Cardiomyopathy (CMS/HCC)     0CT 1999   OFF MEDS AFTER RESOLVED   HOLTER MONITOR APRIL 2016   • Heart palpitations     COMES AND GOES  24 HOLTER DONE 4-13-16   NO RESULTS YET   • History of migraine    • Migraine    • Seasonal allergies    • Shoulder pain, right        Past Surgical History:   Procedure Laterality Date   • D&C HYSTEROSCOPY WITH NOVASURE ENDOMETRIAL ABLATION AND MYOSURE     • DILATATION AND CURETTAGE      COUPLE   • KNEE ARTHROSCOPY      AGE 11   • SHOULDER ACROMIOPLASTY Left     X 2   • SHOULDER ARTHROSCOPY Right 4/28/2016    Procedure: RT SHOULDER ARTHROSCOPY WITH ACROMIOPLSTY, LABRAL DEBRIDEMENT  AND DISTAL CLAVICLE EXCISION;  Surgeon: Rikki Morel MD;  [place] [facility] [place] [facility] [place];  Service:        Family History   Problem Relation Age of Onset   • Hypertension Mother    • Diabetes Mother    • Cancer Mother    • Thyroid disease Mother    • Hypertension Father    • Aneurysm Father    • Stroke Paternal Grandfather    • Cancer Paternal Grandfather        Social History     Social History   • Marital status:      Spouse name: N/A   • Number of children: N/A   • Years of education: N/A     Occupational History   • Not on file.     Social History Main Topics   • Smoking status: Never Smoker   • Smokeless tobacco: Never Used   • Alcohol use Yes      Comment: SELDOM   • Drug use: No   • Sexual activity: Not on file     Other Topics Concern   • Not on file     Social History Narrative   • No narrative on file         There is no immunization history on file for this patient.    Review of Systems:   Review of Systems   Constitutional: Negative.    HENT: Negative.    Eyes: Negative.    Respiratory: Negative.    Cardiovascular: Negative.   "  Gastrointestinal: Negative.    Endocrine: Negative.    Genitourinary: Negative.    Musculoskeletal: Negative.    Skin: Negative.    Allergic/Immunologic: Negative.    Neurological: Negative.    Hematological: Negative.    Psychiatric/Behavioral: Negative.          Physical Exam   Constitutional: She is oriented to person, place, and time. She appears well-developed and well-nourished.   Cardiovascular: Normal rate, regular rhythm and normal heart sounds.    Pulmonary/Chest: Effort normal and breath sounds normal.   Neurological: She is alert and oriented to person, place, and time.   Psychiatric: She has a normal mood and affect. Her behavior is normal.   Vitals reviewed.        Vital Signs     Vitals:    07/17/18 1607   BP: 117/79   BP Location: Left arm   Patient Position: Sitting   Cuff Size: Adult   Pulse: 84   Resp: 16   Temp: 98.1 °F (36.7 °C)   TempSrc: Oral   SpO2: 98%   Weight: 69.4 kg (153 lb)   Height: 167.6 cm (66\")          Results Review:      REVIEWED AND DISCUSSED CLINICAL RESULTS WITH PATIENT      Requested Prescriptions     Signed Prescriptions Disp Refills   • diclofenac sodium (VOTAREN XR) 100 MG 24 hr tablet 30 tablet 5     Sig: Take 1 tablet by mouth Daily.   • escitalopram (LEXAPRO) 10 MG tablet 30 tablet 5     Sig: Take 1 tablet by mouth Daily. Brand name only         Current Outpatient Prescriptions:   •  acetaminophen (TYLENOL) 500 MG tablet, Take 500 mg by mouth every 6 (six) hours as needed for mild pain (1-3)., Disp: , Rfl:   •  diclofenac sodium (VOTAREN XR) 100 MG 24 hr tablet, Take 1 tablet by mouth Daily., Disp: 30 tablet, Rfl: 5  •  escitalopram (LEXAPRO) 10 MG tablet, Take 1 tablet by mouth Daily. Brand name only, Disp: 30 tablet, Rfl: 5    Procedures          Diagnoses and all orders for this visit:    Mixed obsessional thoughts and acts  -     Discontinue: escitalopram (LEXAPRO) 10 MG tablet; Take 1 tablet by mouth Daily.  -     escitalopram (LEXAPRO) 10 MG tablet; Take 1 " tablet by mouth Daily. Brand name only    Primary osteoarthritis of both feet  -     diclofenac sodium (VOTAREN XR) 100 MG 24 hr tablet; Take 1 tablet by mouth Daily.    Other orders  -     Discontinue: meloxicam (MOBIC) 7.5 MG tablet; Take 7.5 mg by mouth Daily.         Return in about 5 years (around 7/17/2023) for Recheck.    uLis Fleming M.D  07/17/18  5:00 PM

## 2019-01-29 ENCOUNTER — OFFICE VISIT (OUTPATIENT)
Dept: INTERNAL MEDICINE | Facility: CLINIC | Age: 54
End: 2019-01-29

## 2019-01-29 VITALS
SYSTOLIC BLOOD PRESSURE: 143 MMHG | BODY MASS INDEX: 26.23 KG/M2 | HEART RATE: 85 BPM | WEIGHT: 163.2 LBS | DIASTOLIC BLOOD PRESSURE: 80 MMHG | TEMPERATURE: 97.9 F | OXYGEN SATURATION: 97 % | HEIGHT: 66 IN

## 2019-01-29 DIAGNOSIS — M79.10 MYALGIA, UNSPECIFIED SITE: ICD-10-CM

## 2019-01-29 DIAGNOSIS — F42.2 MIXED OBSESSIONAL THOUGHTS AND ACTS: Primary | ICD-10-CM

## 2019-01-29 DIAGNOSIS — M19.072 PRIMARY OSTEOARTHRITIS OF BOTH FEET: ICD-10-CM

## 2019-01-29 DIAGNOSIS — E78.2 MIXED HYPERLIPIDEMIA: ICD-10-CM

## 2019-01-29 DIAGNOSIS — M19.071 PRIMARY OSTEOARTHRITIS OF BOTH FEET: ICD-10-CM

## 2019-01-29 DIAGNOSIS — R53.82 CHRONIC FATIGUE: ICD-10-CM

## 2019-01-29 PROBLEM — M19.90 ARTHRITIS: Status: ACTIVE | Noted: 2019-01-29

## 2019-01-29 PROCEDURE — 99213 OFFICE O/P EST LOW 20 MIN: CPT | Performed by: FAMILY MEDICINE

## 2019-01-29 RX ORDER — ESCITALOPRAM OXALATE 20 MG/1
20 TABLET ORAL DAILY
Qty: 90 TABLET | Refills: 1 | Status: SHIPPED | OUTPATIENT
Start: 2019-01-29 | End: 2019-01-31 | Stop reason: SDUPTHER

## 2019-01-30 NOTE — PROGRESS NOTES
CC:difuuse arthritis and myalgi.insomnia  Wt gain  Subjective.../HPI  Patient present today with1) diffuse pain chiropracter,cortisone shots to knees,Pdiclofenac a  I have reviewed the patient's medical history in detail and updated the computerized patient record.  2) sleep poorly - sleeping 5 hrs/night-wakes up frequently  Past Medical History:   Diagnosis Date   • Cardiomyopathy (CMS/HCC)     0CT 1999   OFF MEDS AFTER RESOLVED   HOLTER MONITOR APRIL 2016   • Heart palpitations     COMES AND GOES  24 HOLTER DONE 4-13-16   NO RESULTS YET   • History of migraine    • Migraine    • Seasonal allergies    • Shoulder pain, right        Past Surgical History:   Procedure Laterality Date   • D&C HYSTEROSCOPY WITH NOVASURE ENDOMETRIAL ABLATION AND MYOSURE     • DILATATION AND CURETTAGE      COUPLE   • KNEE ARTHROSCOPY      AGE 11   • SHOULDER ACROMIOPLASTY Left     X 2   • SHOULDER ARTHROSCOPY Right 4/28/2016    Procedure: RT SHOULDER ARTHROSCOPY WITH ACROMIOPLSTY, LABRAL DEBRIDEMENT  AND DISTAL CLAVICLE EXCISION;  Surgeon: Rikki Morel MD;  [place] [facility] [facility] [place]  Service:        Family History   Problem Relation Age of Onset   • Hypertension Mother    • Diabetes Mother    • Cancer Mother    • Thyroid disease Mother    • Hypertension Father    • Aneurysm Father    • Stroke Paternal Grandfather    • Cancer Paternal Grandfather        Social History     Socioeconomic History   • Marital status:      Spouse name: Not on file   • Number of children: Not on file   • Years of education: Not on file   • Highest education level: Not on file   Social Needs   • Financial resource strain: Not on file   • Food insecurity - worry: Not on file   • Food insecurity - inability: Not on file   • Transportation needs - medical: Not on file   • Transportation needs - non-medical: Not on file   Occupational History   • Not on file   Tobacco Use   • Smoking status: Never Smoker   • Smokeless tobacco: Never Used   Substance and  "Sexual Activity   • Alcohol use: Yes     Comment: SELDOM   • Drug use: No   • Sexual activity: Not on file   Other Topics Concern   • Not on file   Social History Narrative   • Not on file         There is no immunization history on file for this patient.    Review of Systems:   Review of Systems   Constitutional: Negative.    HENT: Negative.    Eyes: Negative.    Respiratory: Negative.    Cardiovascular: Negative.    Gastrointestinal: Negative.    Endocrine: Negative.    Genitourinary: Negative.    Musculoskeletal: Negative.    Skin: Negative.    Allergic/Immunologic: Negative.    Neurological: Negative.    Hematological: Negative.    Psychiatric/Behavioral: Negative.          Physical Exam      Vital Signs     Vitals:    01/29/19 1836   BP: 143/80   BP Location: Left arm   Patient Position: Sitting   Cuff Size: Small Adult   Pulse: 85   Temp: 97.9 °F (36.6 °C)   TempSrc: Oral   SpO2: 97%   Weight: 74 kg (163 lb 3.2 oz)   Height: 167.6 cm (66\")          Results Review:      {EBMLABREVIEWS:07751}      Requested Prescriptions      No prescriptions requested or ordered in this encounter         Current Outpatient Medications:   •  acetaminophen (TYLENOL) 500 MG tablet, Take 500 mg by mouth every 6 (six) hours as needed for mild pain (1-3)., Disp: , Rfl:   •  diclofenac sodium (VOTAREN XR) 100 MG 24 hr tablet, Take 1 tablet by mouth Daily., Disp: 30 tablet, Rfl: 5  •  escitalopram (LEXAPRO) 10 MG tablet, Take 1 tablet by mouth Daily. Brand name only, Disp: 30 tablet, Rfl: 5    Procedures          There are no diagnoses linked to this encounter.    There are no Patient Instructions on file for this visit.     No Follow-up on file.    Luis Fleming M.D  01/29/19  8:12 PM          "

## 2019-01-30 NOTE — PROGRESS NOTES
CC:generalized arthralgia and myalgia  Insomnia- 5hrs poor sleep  OCD  Subjective.../HPI4 mons pain, trie steroid injections,chiropracter and PTPatient present today with    I have reviewed the patient's medical history in detail and updated the computerized patient record.    Past Medical History:   Diagnosis Date   • Cardiomyopathy (CMS/HCC)     0CT 1999   OFF MEDS AFTER RESOLVED   HOLTER MONITOR APRIL 2016   • Heart palpitations     COMES AND GOES  24 HOLTER DONE 4-13-16   NO RESULTS YET   • History of migraine    • Migraine    • Seasonal allergies    • Shoulder pain, right        Past Surgical History:   Procedure Laterality Date   • D&C HYSTEROSCOPY WITH NOVASURE ENDOMETRIAL ABLATION AND MYOSURE     • DILATATION AND CURETTAGE      COUPLE   • KNEE ARTHROSCOPY      AGE 11   • SHOULDER ACROMIOPLASTY Left     X 2   • SHOULDER ARTHROSCOPY Right 4/28/2016    Procedure: RT SHOULDER ARTHROSCOPY WITH ACROMIOPLSTY, LABRAL DEBRIDEMENT  AND DISTAL CLAVICLE EXCISION;  Surgeon: Rikki Morel MD;  [place] [facility] [place] [facility] [place]:        Family History   Problem Relation Age of Onset   • Hypertension Mother    • Diabetes Mother    • Cancer Mother    • Thyroid disease Mother    • Hypertension Father    • Aneurysm Father    • Stroke Paternal Grandfather    • Cancer Paternal Grandfather        Social History     Socioeconomic History   • Marital status:      Spouse name: Not on file   • Number of children: Not on file   • Years of education: Not on file   • Highest education level: Not on file   Social Needs   • Financial resource strain: Not on file   • Food insecurity - worry: Not on file   • Food insecurity - inability: Not on file   • Transportation needs - medical: Not on file   • Transportation needs - non-medical: Not on file   Occupational History   • Not on file   Tobacco Use   • Smoking status: Never Smoker   • Smokeless tobacco: Never Used   Substance and Sexual Activity   • Alcohol use: Yes     Comment:  "SELDOM   • Drug use: No   • Sexual activity: Not on file   Other Topics Concern   • Not on file   Social History Narrative   • Not on file         There is no immunization history on file for this patient.    Review of Systems:   Review of Systems   Constitutional: Negative.    HENT: Negative.    Eyes: Negative.    Respiratory: Negative.    Cardiovascular: Negative.    Gastrointestinal: Negative.    Endocrine: Negative.    Genitourinary: Negative.    Musculoskeletal: Negative.    Skin: Negative.    Allergic/Immunologic: Negative.    Neurological: Negative.    Hematological: Negative.    Psychiatric/Behavioral: Negative.          Physical Exam   Constitutional: She is oriented to person, place, and time. She appears well-developed and well-nourished.   HENT:   Head: Normocephalic and atraumatic.   Mouth/Throat: Oropharynx is clear and moist.   Neck: Normal range of motion. Neck supple.   Cardiovascular: Normal rate, regular rhythm and normal heart sounds.   Pulmonary/Chest: Effort normal and breath sounds normal.   Abdominal: Soft. Bowel sounds are normal.   Neurological: She is alert and oriented to person, place, and time.   Skin:   L ant thigh with 1cm by 3 cm    Psychiatric: She has a normal mood and affect. Her behavior is normal.   Vitals reviewed.        Vital Signs     Vitals:    01/29/19 1836   BP: 143/80   BP Location: Left arm   Patient Position: Sitting   Cuff Size: Small Adult   Pulse: 85   Temp: 97.9 °F (36.6 °C)   TempSrc: Oral   SpO2: 97%   Weight: 74 kg (163 lb 3.2 oz)   Height: 167.6 cm (66\")          Results Review:      REVIEWED AND DISCUSSED CLINICAL RESULTS WITH PATIENT      Requested Prescriptions     Pending Prescriptions Disp Refills   • diclofenac sodium (VOTAREN XR) 100 MG 24 hr tablet 30 tablet 5     Sig: Take 1 tablet by mouth Daily.     Signed Prescriptions Disp Refills   • escitalopram (LEXAPRO) 20 MG tablet 90 tablet 1     Sig: Take 1 tablet by mouth Daily. Brand name only "         Current Outpatient Medications:   •  acetaminophen (TYLENOL) 500 MG tablet, Take 500 mg by mouth every 6 (six) hours as needed for mild pain (1-3)., Disp: , Rfl:   •  diclofenac sodium (VOTAREN XR) 100 MG 24 hr tablet, Take 1 tablet by mouth Daily., Disp: 30 tablet, Rfl: 5  •  escitalopram (LEXAPRO) 20 MG tablet, Take 1 tablet by mouth Daily. Brand name only, Disp: 90 tablet, Rfl: 1    Procedures          Diagnoses and all orders for this visit:    Mixed obsessional thoughts and acts  -     escitalopram (LEXAPRO) 20 MG tablet; Take 1 tablet by mouth Daily. Brand name only    Chronic fatigue  -     CBC & Differential; Future  -     Comprehensive Metabolic Panel; Future  -     Lipid Panel With LDL / HDL Ratio; Future  -     TSH; Future  -     Vitamin D 25 Hydroxy; Future  -     C-reactive Protein; Future  -     Rheumatoid Factor; Future  -     Sedimentation Rate; Future  -     CAL; Future  -     T4; Future  -     T3; Future    Mixed hyperlipidemia  -     Lipid Panel With LDL / HDL Ratio; Future    Myalgia, unspecified site  -     C-reactive Protein; Future  -     Rheumatoid Factor; Future  -     Sedimentation Rate; Future    Primary osteoarthritis of both feet  -     diclofenac sodium (VOTAREN XR) 100 MG 24 hr tablet; Take 1 tablet by mouth Daily.        There are no Patient Instructions on file for this visit.     Return in about 3 months (around 4/29/2019) for Recheck.    Luis Fleming M.D  01/29/19  9:21 PM

## 2019-01-31 DIAGNOSIS — F42.2 MIXED OBSESSIONAL THOUGHTS AND ACTS: ICD-10-CM

## 2019-01-31 RX ORDER — ESCITALOPRAM OXALATE 20 MG/1
20 TABLET ORAL DAILY
Qty: 90 TABLET | Refills: 1 | Status: SHIPPED | OUTPATIENT
Start: 2019-01-31 | End: 2019-07-26 | Stop reason: SDUPTHER

## 2019-02-01 LAB
25(OH)D3+25(OH)D2 SERPL-MCNC: 23.4 NG/ML (ref 30–100)
ALBUMIN SERPL-MCNC: 3.8 G/DL (ref 3.5–5.2)
ALBUMIN/GLOB SERPL: 1.3 G/DL
ALP SERPL-CCNC: 53 U/L (ref 39–117)
ALT SERPL-CCNC: 15 U/L (ref 1–33)
ANA SER QL: POSITIVE
AST SERPL-CCNC: 14 U/L (ref 1–32)
BASOPHILS # BLD AUTO: 0.01 10*3/MM3 (ref 0–0.2)
BASOPHILS NFR BLD AUTO: 0.2 % (ref 0–1.5)
BILIRUB SERPL-MCNC: 0.6 MG/DL (ref 0.1–1.2)
BUN SERPL-MCNC: 15 MG/DL (ref 6–20)
BUN/CREAT SERPL: 16.3 (ref 7–25)
CALCIUM SERPL-MCNC: 9.2 MG/DL (ref 8.6–10.5)
CHLORIDE SERPL-SCNC: 104 MMOL/L (ref 98–107)
CHOLEST SERPL-MCNC: 245 MG/DL (ref 0–200)
CO2 SERPL-SCNC: 26.1 MMOL/L (ref 22–29)
CREAT SERPL-MCNC: 0.92 MG/DL (ref 0.57–1)
CRP SERPL-MCNC: 0.16 MG/DL (ref 0–0.5)
DSDNA AB SER-ACNC: 2 IU/ML (ref 0–9)
EOSINOPHIL # BLD AUTO: 0.16 10*3/MM3 (ref 0–0.7)
EOSINOPHIL NFR BLD AUTO: 2.6 % (ref 0.3–6.2)
ERYTHROCYTE [DISTWIDTH] IN BLOOD BY AUTOMATED COUNT: 12.4 % (ref 11.7–13)
ERYTHROCYTE [SEDIMENTATION RATE] IN BLOOD BY WESTERGREN METHOD: 3 MM/HR (ref 0–30)
GLOBULIN SER CALC-MCNC: 2.9 GM/DL
GLUCOSE SERPL-MCNC: 92 MG/DL (ref 65–99)
HCT VFR BLD AUTO: 44.3 % (ref 35.6–45.5)
HDLC SERPL-MCNC: 57 MG/DL (ref 40–60)
HGB BLD-MCNC: 14.9 G/DL (ref 11.9–15.5)
IMM GRANULOCYTES # BLD AUTO: 0 10*3/MM3 (ref 0–0.03)
IMM GRANULOCYTES NFR BLD AUTO: 0 % (ref 0–0.5)
LDLC SERPL CALC-MCNC: 169 MG/DL (ref 0–100)
LDLC/HDLC SERPL: 2.96 {RATIO}
LYMPHOCYTES # BLD AUTO: 2.15 10*3/MM3 (ref 0.9–4.8)
LYMPHOCYTES NFR BLD AUTO: 34.9 % (ref 19.6–45.3)
Lab: NORMAL
MCH RBC QN AUTO: 31.6 PG (ref 26.9–32)
MCHC RBC AUTO-ENTMCNC: 33.6 G/DL (ref 32.4–36.3)
MCV RBC AUTO: 93.9 FL (ref 80.5–98.2)
MONOCYTES # BLD AUTO: 0.48 10*3/MM3 (ref 0.2–1.2)
MONOCYTES NFR BLD AUTO: 7.8 % (ref 5–12)
NEUTROPHILS # BLD AUTO: 3.36 10*3/MM3 (ref 1.9–8.1)
NEUTROPHILS NFR BLD AUTO: 54.5 % (ref 42.7–76)
PLATELET # BLD AUTO: 314 10*3/MM3 (ref 140–500)
POTASSIUM SERPL-SCNC: 4.1 MMOL/L (ref 3.5–5.2)
PROT SERPL-MCNC: 6.7 G/DL (ref 6–8.5)
RBC # BLD AUTO: 4.72 10*6/MM3 (ref 3.9–5.2)
SODIUM SERPL-SCNC: 141 MMOL/L (ref 136–145)
T3 SERPL-MCNC: 115.2 NG/DL (ref 80–200)
T4 SERPL-MCNC: 6.66 MCG/DL (ref 4.5–11.7)
TRIGL SERPL-MCNC: 97 MG/DL (ref 0–150)
TSH SERPL DL<=0.005 MIU/L-ACNC: 2.16 MIU/ML (ref 0.27–4.2)
VLDLC SERPL CALC-MCNC: 19.4 MG/DL (ref 5–40)
WBC # BLD AUTO: 6.16 10*3/MM3 (ref 4.5–10.7)

## 2019-02-03 ENCOUNTER — RESULTS ENCOUNTER (OUTPATIENT)
Dept: INTERNAL MEDICINE | Facility: CLINIC | Age: 54
End: 2019-02-03

## 2019-02-03 DIAGNOSIS — M79.10 MYALGIA, UNSPECIFIED SITE: ICD-10-CM

## 2019-02-03 DIAGNOSIS — E78.2 MIXED HYPERLIPIDEMIA: ICD-10-CM

## 2019-02-03 DIAGNOSIS — R53.82 CHRONIC FATIGUE: ICD-10-CM

## 2019-02-05 ENCOUNTER — TELEPHONE (OUTPATIENT)
Dept: INTERNAL MEDICINE | Facility: CLINIC | Age: 54
End: 2019-02-05

## 2019-02-05 DIAGNOSIS — E78.5 HYPERLIPIDEMIA LDL GOAL <130: Primary | ICD-10-CM

## 2019-02-05 RX ORDER — ERGOCALCIFEROL 1.25 MG/1
50000 CAPSULE ORAL WEEKLY
Qty: 30 CAPSULE | Refills: 6 | Status: SHIPPED | OUTPATIENT
Start: 2019-02-05 | End: 2020-02-05

## 2019-02-05 RX ORDER — ATORVASTATIN CALCIUM 10 MG/1
10 TABLET, FILM COATED ORAL DAILY
Qty: 30 TABLET | Refills: 6 | Status: SHIPPED | OUTPATIENT
Start: 2019-02-05 | End: 2019-09-27 | Stop reason: SDUPTHER

## 2019-02-07 DIAGNOSIS — M79.10 MYALGIA: Primary | ICD-10-CM

## 2019-02-10 ENCOUNTER — RESULTS ENCOUNTER (OUTPATIENT)
Dept: INTERNAL MEDICINE | Facility: CLINIC | Age: 54
End: 2019-02-10

## 2019-02-10 DIAGNOSIS — E78.5 HYPERLIPIDEMIA LDL GOAL <130: ICD-10-CM

## 2019-02-12 ENCOUNTER — RESULTS ENCOUNTER (OUTPATIENT)
Dept: INTERNAL MEDICINE | Facility: CLINIC | Age: 54
End: 2019-02-12

## 2019-02-12 DIAGNOSIS — M79.10 MYALGIA: ICD-10-CM

## 2019-03-05 LAB
ALBUMIN SERPL-MCNC: 4.4 G/DL (ref 3.5–5.2)
ALBUMIN/GLOB SERPL: 1.7 G/DL
ALP SERPL-CCNC: 58 U/L (ref 39–117)
ALT SERPL-CCNC: 18 U/L (ref 1–33)
AST SERPL-CCNC: 14 U/L (ref 1–32)
BILIRUB SERPL-MCNC: 0.4 MG/DL (ref 0.1–1.2)
BUN SERPL-MCNC: 18 MG/DL (ref 6–20)
BUN/CREAT SERPL: 18.9 (ref 7–25)
CALCIUM SERPL-MCNC: 9.5 MG/DL (ref 8.6–10.5)
CHLORIDE SERPL-SCNC: 103 MMOL/L (ref 98–107)
CO2 SERPL-SCNC: 22.9 MMOL/L (ref 22–29)
CREAT SERPL-MCNC: 0.95 MG/DL (ref 0.57–1)
GLOBULIN SER CALC-MCNC: 2.6 GM/DL
GLUCOSE SERPL-MCNC: 99 MG/DL (ref 65–99)
POTASSIUM SERPL-SCNC: 4.1 MMOL/L (ref 3.5–5.2)
PROT SERPL-MCNC: 7 G/DL (ref 6–8.5)
SODIUM SERPL-SCNC: 142 MMOL/L (ref 136–145)

## 2019-03-06 LAB
ANA SER QL: POSITIVE
DSDNA AB SER-ACNC: 2 IU/ML (ref 0–9)
ENA RNP AB SER-ACNC: 2.2 AI (ref 0–0.9)
ENA SCL70 AB SER-ACNC: <0.2 AI (ref 0–0.9)
ENA SM AB SER-ACNC: <0.2 AI (ref 0–0.9)
ENA SS-A AB SER-ACNC: <0.2 AI (ref 0–0.9)
ENA SS-B AB SER-ACNC: <0.2 AI (ref 0–0.9)
Lab: NORMAL

## 2019-03-12 ENCOUNTER — TELEPHONE (OUTPATIENT)
Dept: INTERNAL MEDICINE | Facility: CLINIC | Age: 54
End: 2019-03-12

## 2019-03-12 DIAGNOSIS — E78.2 MIXED HYPERLIPIDEMIA: Primary | ICD-10-CM

## 2019-03-13 DIAGNOSIS — M35.1 MIXED CONNECTIVE TISSUE DISEASE (HCC): Primary | ICD-10-CM

## 2019-03-17 ENCOUNTER — RESULTS ENCOUNTER (OUTPATIENT)
Dept: INTERNAL MEDICINE | Facility: CLINIC | Age: 54
End: 2019-03-17

## 2019-03-17 DIAGNOSIS — E78.2 MIXED HYPERLIPIDEMIA: ICD-10-CM

## 2019-03-19 DIAGNOSIS — Z83.49 FAMILY HISTORY OF VITAMIN D DEFICIENCY: Primary | ICD-10-CM

## 2019-03-20 ENCOUNTER — RESULTS ENCOUNTER (OUTPATIENT)
Dept: INTERNAL MEDICINE | Facility: CLINIC | Age: 54
End: 2019-03-20

## 2019-03-20 DIAGNOSIS — M79.10 MUSCLE PAIN: ICD-10-CM

## 2019-03-20 DIAGNOSIS — M79.10 MUSCLE PAIN: Primary | ICD-10-CM

## 2019-03-21 ENCOUNTER — OFFICE VISIT (OUTPATIENT)
Dept: INTERNAL MEDICINE | Facility: CLINIC | Age: 54
End: 2019-03-21

## 2019-03-21 VITALS
TEMPERATURE: 97.5 F | BODY MASS INDEX: 25.13 KG/M2 | HEIGHT: 66 IN | SYSTOLIC BLOOD PRESSURE: 123 MMHG | RESPIRATION RATE: 16 BRPM | OXYGEN SATURATION: 98 % | WEIGHT: 156.4 LBS | HEART RATE: 113 BPM | DIASTOLIC BLOOD PRESSURE: 89 MMHG

## 2019-03-21 DIAGNOSIS — L23.9 ALLERGIC DERMATITIS: Primary | ICD-10-CM

## 2019-03-21 DIAGNOSIS — E55.9 VITAMIN D DEFICIENCY: ICD-10-CM

## 2019-03-21 PROCEDURE — 99213 OFFICE O/P EST LOW 20 MIN: CPT | Performed by: NURSE PRACTITIONER

## 2019-03-21 RX ORDER — METHYLPREDNISOLONE 4 MG/1
TABLET ORAL
Qty: 21 TABLET | Refills: 0 | Status: SHIPPED | OUTPATIENT
Start: 2019-03-21 | End: 2019-05-15

## 2019-03-21 NOTE — PROGRESS NOTES
Subjective   Saray Green is a 53 y.o. female.   Chief Complaint   Patient presents with   • Rash     Pt states she thinks it may be related to recent medication changes        Patient presents for evaluation of rash.  This is a 53-year-old female patient of Dr. Fleming.  On March 7, she stated she started 3 new medications including atorvastatin, vitamin D, and diclofenac.  She states that she was on the medications for about 1 week and was doing fine, but about 1 week ago ago developed a rash that began on the bilateral forearms and was itchy, and she noticed that it was spreading to the legs, torso, back.  She states that it was very red and itchy, has improved over the past couple of days..  She does state that she was prescribed vitamin D 50,000 unit capsules that she was supposed to take weekly, but she instead took 1/day for the first week that she was on it, which she feels may be contributing to this situation.  She states that over the past 3-4 days the rash has gotten much better, although it still itches it is less widespread and much less red.  She developed no shortness of breath, chest discomfort, fever, chills.  She is having no drainage from anywhere and no crusting of the rash.  She does state that she is currently being worked up for a possible connective tissue disorder, and has a referral to rheumatology in the works.  She has been taking Benadryl, which is helping with the itch and seems to be helping the rash to subside.  She denies development of any other new issues today.         The following portions of the patient's history were reviewed and updated as appropriate: allergies, current medications, past family history, past medical history, past social history, past surgical history and problem list.    Review of Systems   Constitutional: Negative for activity change, chills, fatigue, fever, unexpected weight gain and unexpected weight loss.   HENT: Negative for congestion, hearing  "loss, postnasal drip, sinus pressure, sneezing, sore throat and tinnitus.    Eyes: Negative for photophobia, pain and visual disturbance.   Respiratory: Negative for cough, chest tightness, shortness of breath and wheezing.    Cardiovascular: Negative for chest pain, palpitations and leg swelling.   Gastrointestinal: Negative for abdominal distention, abdominal pain, constipation, diarrhea, nausea and vomiting.   Endocrine: Negative for polydipsia, polyphagia and polyuria.   Genitourinary: Negative for dysuria, frequency, hematuria and urgency.   Skin: Positive for rash.   Neurological: Negative for dizziness, weakness, numbness and headache.       Objective    /89 (BP Location: Left arm, Patient Position: Sitting, Cuff Size: Small Adult)   Pulse 113   Temp 97.5 °F (36.4 °C) (Oral)   Resp 16   Ht 167.6 cm (66\")   Wt 70.9 kg (156 lb 6.4 oz)   SpO2 98%   BMI 25.24 kg/m²     Physical Exam   Constitutional: She is oriented to person, place, and time. She appears well-developed and well-nourished. No distress.   HENT:   Head: Normocephalic and atraumatic.   Right Ear: External ear normal.   Left Ear: External ear normal.   Nose: Nose normal.   Mouth/Throat: Oropharynx is clear and moist.   Eyes: Conjunctivae and EOM are normal. Pupils are equal, round, and reactive to light.   Neck: Normal range of motion. Neck supple.   Cardiovascular: Normal rate, regular rhythm, normal heart sounds and intact distal pulses. Exam reveals no gallop and no friction rub.   No murmur heard.  Pulmonary/Chest: Effort normal and breath sounds normal. No stridor. No respiratory distress. She has no wheezes. She has no rales. She exhibits no tenderness.   Lungs are CTA bilaterally   Musculoskeletal: Normal range of motion.   Neurological: She is alert and oriented to person, place, and time.   Skin: Skin is warm and dry. Capillary refill takes less than 2 seconds. Rash noted. Rash is urticarial. She is not diaphoretic.   Raised " urticarial, papular rash to bilateral forearms, lower back, torso, upper back.  Slightly erythematous.  No drainage.  No crusting.   Psychiatric: She has a normal mood and affect. Her behavior is normal. Judgment and thought content normal.   Nursing note and vitals reviewed.        Assessment/Plan   Saray Cadet was seen today for rash.    Diagnoses and all orders for this visit:    Allergic dermatitis  -     methylPREDNISolone (MEDROL, TEE,) 4 MG tablet; Take as directed on package instructions.    Vitamin D deficiency  -     Magnesium  -     Basic Metabolic Panel      -Allergic dermatitis: The rash is getting better and she is still on the atorvastatin and diclofenac, so I believe it is safe to continue these.  She has discontinued the vitamin D at this time, as she states that she was only supposed to be taking 50,000 unit capsule weekly and she took one per day for a week after she began.  She is getting a vitamin D level rechecked today per Dr. Fleming and will await further recommendations from him on the vitamin D.  Will provide a Medrol Dosepak today for the allergic dermatitis, and she may use Benadryl cream or low potency over-the-counter hydrocortisone cream for particularly pruritic spots.  She may continue an antihistamine as well.    -She requests that a calcium level and magnesium level be drawn today due to the overabundance of vitamin D that she took.  Ordered a mag and BMP be added to the labs that Dr. Fleming already ordered for today.    -Follow-up if symptoms persist or worsen.  Follow-up routinely with PCP, Dr. Fleming.

## 2019-03-22 LAB
25(OH)D3+25(OH)D2 SERPL-MCNC: 46.1 NG/ML (ref 30–100)
ALDOLASE SERPL-CCNC: 2.9 U/L (ref 3.3–10.3)
BUN SERPL-MCNC: 13 MG/DL (ref 6–20)
BUN/CREAT SERPL: 14.3 (ref 7–25)
CALCIUM SERPL-MCNC: 9.6 MG/DL (ref 8.6–10.5)
CHLORIDE SERPL-SCNC: 101 MMOL/L (ref 98–107)
CK MB SERPL-MCNC: <1 NG/ML
CK SERPL-CCNC: 34 U/L (ref 20–180)
CO2 SERPL-SCNC: 21.4 MMOL/L (ref 22–29)
CREAT SERPL-MCNC: 0.91 MG/DL (ref 0.57–1)
GLUCOSE SERPL-MCNC: 94 MG/DL (ref 65–99)
MAGNESIUM SERPL-MCNC: 2 MG/DL (ref 1.6–2.6)
POTASSIUM SERPL-SCNC: 4 MMOL/L (ref 3.5–5.2)
SODIUM SERPL-SCNC: 140 MMOL/L (ref 136–145)

## 2019-03-24 ENCOUNTER — RESULTS ENCOUNTER (OUTPATIENT)
Dept: INTERNAL MEDICINE | Facility: CLINIC | Age: 54
End: 2019-03-24

## 2019-03-24 DIAGNOSIS — Z83.49 FAMILY HISTORY OF VITAMIN D DEFICIENCY: ICD-10-CM

## 2019-03-25 NOTE — PROGRESS NOTES
Please inform patient that there were no abnormalities in magnesium, sodium, potassium, calcium. Her vitamin D was also normal.

## 2019-03-26 ENCOUNTER — TELEPHONE (OUTPATIENT)
Dept: INTERNAL MEDICINE | Facility: CLINIC | Age: 54
End: 2019-03-26

## 2019-03-26 DIAGNOSIS — E55.9 VITAMIN D DEFICIENCY: Primary | ICD-10-CM

## 2019-03-29 ENCOUNTER — TELEPHONE (OUTPATIENT)
Dept: INTERNAL MEDICINE | Facility: CLINIC | Age: 54
End: 2019-03-29

## 2019-03-29 DIAGNOSIS — F51.01 PRIMARY INSOMNIA: Primary | ICD-10-CM

## 2019-03-29 RX ORDER — HYDROXYZINE HYDROCHLORIDE 10 MG/1
10 TABLET, FILM COATED ORAL NIGHTLY PRN
Qty: 30 TABLET | Refills: 1 | Status: SHIPPED | OUTPATIENT
Start: 2019-03-29 | End: 2019-05-29 | Stop reason: SDUPTHER

## 2019-03-29 NOTE — TELEPHONE ENCOUNTER
Pt called and stated that you saw her last week and she spoke with Dr Fleming on Tuesday and she is not sleeping at all. Pt states she has been taking Tylenol Pm and it is not working. Dr Fleming recommending calling the office if nothing helped to be prescribed a sleep aid. Please contact pt about this. Thanks.

## 2019-03-29 NOTE — PROGRESS NOTES
Spoke with patient on the phone.  She is having a lot of issues with anxiety and is often unable to fall asleep until 4:00 in the morning.  She has been taking Tylenol PM which has had no effect.  We will try her on hydroxyzine 10 mg at bedtime.  She will call back in a week and let me know if this has been ineffective, at which time we will try something different.

## 2019-03-31 ENCOUNTER — RESULTS ENCOUNTER (OUTPATIENT)
Dept: INTERNAL MEDICINE | Facility: CLINIC | Age: 54
End: 2019-03-31

## 2019-03-31 DIAGNOSIS — E55.9 VITAMIN D DEFICIENCY: ICD-10-CM

## 2019-04-13 LAB
25(OH)D3+25(OH)D2 SERPL-MCNC: 31.4 NG/ML (ref 30–100)
ALBUMIN SERPL-MCNC: 3.8 G/DL (ref 3.5–5.2)
ALBUMIN/GLOB SERPL: 1.3 G/DL
ALP SERPL-CCNC: 39 U/L (ref 39–117)
ALT SERPL-CCNC: 56 U/L (ref 1–33)
AST SERPL-CCNC: 39 U/L (ref 1–32)
BILIRUB SERPL-MCNC: 0.4 MG/DL (ref 0.2–1.2)
BUN SERPL-MCNC: 12 MG/DL (ref 6–20)
BUN/CREAT SERPL: 13.8 (ref 7–25)
CALCIUM SERPL-MCNC: 9.6 MG/DL (ref 8.6–10.5)
CHLORIDE SERPL-SCNC: 103 MMOL/L (ref 98–107)
CHOLEST SERPL-MCNC: 202 MG/DL (ref 0–200)
CO2 SERPL-SCNC: 25.6 MMOL/L (ref 22–29)
CREAT SERPL-MCNC: 0.87 MG/DL (ref 0.57–1)
GLOBULIN SER CALC-MCNC: 2.9 GM/DL
GLUCOSE SERPL-MCNC: 87 MG/DL (ref 65–99)
HDLC SERPL-MCNC: 54 MG/DL (ref 40–60)
LDLC SERPL CALC-MCNC: 120 MG/DL (ref 0–100)
LDLC/HDLC SERPL: 2.21 {RATIO}
POTASSIUM SERPL-SCNC: 4.5 MMOL/L (ref 3.5–5.2)
PROT SERPL-MCNC: 6.7 G/DL (ref 6–8.5)
SODIUM SERPL-SCNC: 142 MMOL/L (ref 136–145)
TRIGL SERPL-MCNC: 142 MG/DL (ref 0–150)
VLDLC SERPL CALC-MCNC: 28.4 MG/DL (ref 5–40)

## 2019-04-16 ENCOUNTER — OFFICE VISIT (OUTPATIENT)
Dept: INTERNAL MEDICINE | Facility: CLINIC | Age: 54
End: 2019-04-16

## 2019-04-16 VITALS
DIASTOLIC BLOOD PRESSURE: 79 MMHG | OXYGEN SATURATION: 99 % | HEART RATE: 104 BPM | WEIGHT: 153 LBS | TEMPERATURE: 97.3 F | BODY MASS INDEX: 24.59 KG/M2 | SYSTOLIC BLOOD PRESSURE: 122 MMHG | HEIGHT: 66 IN

## 2019-04-16 DIAGNOSIS — R53.82 CHRONIC FATIGUE: ICD-10-CM

## 2019-04-16 DIAGNOSIS — R74.01 TRANSAMINITIS: Primary | ICD-10-CM

## 2019-04-16 DIAGNOSIS — E78.2 MIXED HYPERLIPIDEMIA: ICD-10-CM

## 2019-04-16 DIAGNOSIS — M79.10 MYALGIA, UNSPECIFIED SITE: ICD-10-CM

## 2019-04-16 DIAGNOSIS — E55.9 VITAMIN D DEFICIENCY: ICD-10-CM

## 2019-04-16 PROCEDURE — 99213 OFFICE O/P EST LOW 20 MIN: CPT | Performed by: FAMILY MEDICINE

## 2019-04-16 RX ORDER — METHYLPREDNISOLONE 4 MG/1
TABLET ORAL
Qty: 21 TABLET | Refills: 0 | Status: SHIPPED | OUTPATIENT
Start: 2019-04-16 | End: 2019-05-15

## 2019-04-16 NOTE — PROGRESS NOTES
CC:arthritis,abnl liver fct    Subjective.../HPI  Patient present today with had recent blood showing incease transaminemia    I have reviewed the patient's medical history in detail and updated the computerized patient record.    Past Medical History:   Diagnosis Date   • Cardiomyopathy (CMS/HCC)     0CT 1999   OFF MEDS AFTER RESOLVED   HOLTER MONITOR APRIL 2016   • Heart palpitations     COMES AND GOES  24 HOLTER DONE 4-13-16   NO RESULTS YET   • History of migraine    • Migraine    • Seasonal allergies    • Shoulder pain, right        Past Surgical History:   Procedure Laterality Date   • D&C HYSTEROSCOPY WITH NOVASURE ENDOMETRIAL ABLATION AND MYOSURE     • DILATATION AND CURETTAGE      COUPLE   • KNEE ARTHROSCOPY      AGE 11   • SHOULDER ACROMIOPLASTY Left     X 2   • SHOULDER ARTHROSCOPY Right 4/28/2016    Procedure: RT SHOULDER ARTHROSCOPY WITH ACROMIOPLSTY, LABRAL DEBRIDEMENT  AND DISTAL CLAVICLE EXCISION;  Surgeon: Rikki Morel MD;  [place] [facility] [place] [facility] [place]  Service:        Family History   Problem Relation Age of Onset   • Hypertension Mother    • Diabetes Mother    • Cancer Mother    • Thyroid disease Mother    • Hypertension Father    • Aneurysm Father    • Stroke Paternal Grandfather    • Cancer Paternal Grandfather        Social History     Socioeconomic History   • Marital status:      Spouse name: Not on file   • Number of children: Not on file   • Years of education: Not on file   • Highest education level: Not on file   Tobacco Use   • Smoking status: Never Smoker   • Smokeless tobacco: Never Used   Substance and Sexual Activity   • Alcohol use: Yes     Comment: SELDOM   • Drug use: No         There is no immunization history on file for this patient.    Review of Systems:   Review of Systems   Constitutional: Negative.    HENT: Negative.    Respiratory: Negative.    Cardiovascular: Negative.    Endocrine: Negative.    Genitourinary: Negative.    Musculoskeletal: Negative.    All other  "systems reviewed and are negative.        Physical Exam   Constitutional: She is oriented to person, place, and time. She appears well-nourished.   Cardiovascular: Normal rate, regular rhythm and normal heart sounds.   Pulmonary/Chest: Effort normal and breath sounds normal.   Neurological: She is alert and oriented to person, place, and time.   Psychiatric: She has a normal mood and affect. Her behavior is normal. Thought content normal.         Vital Signs     Vitals:    04/16/19 1434   BP: 122/79   BP Location: Left arm   Patient Position: Sitting   Cuff Size: Small Adult   Pulse: 104   Temp: 97.3 °F (36.3 °C)   TempSrc: Oral   SpO2: 99%   Weight: 69.4 kg (153 lb)   Height: 167.6 cm (66\")          Results Review:      REVIEWED AND DISCUSSED CLINICAL RESULTS WITH PATIENT      Requested Prescriptions     Signed Prescriptions Disp Refills   • methylPREDNISolone (MEDROL, TEE,) 4 MG tablet 21 tablet 0     Sig: Take as directed on package instructions.         Current Outpatient Medications:   •  atorvastatin (LIPITOR) 10 MG tablet, Take 1 tablet by mouth Daily., Disp: 30 tablet, Rfl: 6  •  diclofenac sodium (VOTAREN XR) 100 MG 24 hr tablet, Take 1 tablet by mouth Daily., Disp: 30 tablet, Rfl: 5  •  escitalopram (LEXAPRO) 20 MG tablet, Take 1 tablet by mouth Daily. Brand name only, Disp: 90 tablet, Rfl: 1  •  hydrOXYzine (ATARAX) 10 MG tablet, Take 1 tablet by mouth At Night As Needed (Insomnia, anxiety)., Disp: 30 tablet, Rfl: 1  •  vitamin D (ERGOCALCIFEROL) 95737 units capsule capsule, Take 1 capsule by mouth 1 (One) Time Per Week., Disp: 30 capsule, Rfl: 6  •  methylPREDNISolone (MEDROL, TEE,) 4 MG tablet, Take as directed on package instructions., Disp: 21 tablet, Rfl: 0  •  methylPREDNISolone (MEDROL, TEE,) 4 MG tablet, Take as directed on package instructions., Disp: 21 tablet, Rfl: 0    Procedures          Diagnoses and all orders for this visit:    Transaminitis  -     Comprehensive Metabolic Panel; Future  -  "    Hepatitis C Antibody; Future  -     Cancel: Measles / Mumps / Rubella Immunity; Future  -     Measles / Mumps / Rubella Immunity    Chronic fatigue    Mixed hyperlipidemia  -     Lipid Panel With LDL / HDL Ratio; Future    Myalgia, unspecified site    Vitamin D deficiency  -     Vitamin D 25 Hydroxy; Future    Other orders  -     methylPREDNISolone (MEDROL, TEE,) 4 MG tablet; Take as directed on package instructions.        There are no Patient Instructions on file for this visit.     No Follow-up on file.    Luis Fleming M.D  04/16/19  4:21 PM

## 2019-04-17 LAB
MEV IGG SER IA-ACNC: <25 AU/ML
MUV IGG SER IA-ACNC: 92.9 AU/ML
RUBV IGG SERPL IA-ACNC: 1.34 INDEX

## 2019-04-21 ENCOUNTER — RESULTS ENCOUNTER (OUTPATIENT)
Dept: INTERNAL MEDICINE | Facility: CLINIC | Age: 54
End: 2019-04-21

## 2019-04-21 DIAGNOSIS — E55.9 VITAMIN D DEFICIENCY: ICD-10-CM

## 2019-04-21 DIAGNOSIS — E78.2 MIXED HYPERLIPIDEMIA: ICD-10-CM

## 2019-04-21 DIAGNOSIS — R74.01 TRANSAMINITIS: ICD-10-CM

## 2019-05-15 ENCOUNTER — OFFICE VISIT (OUTPATIENT)
Dept: NEUROLOGY | Facility: CLINIC | Age: 54
End: 2019-05-15

## 2019-05-15 VITALS
SYSTOLIC BLOOD PRESSURE: 138 MMHG | WEIGHT: 153 LBS | DIASTOLIC BLOOD PRESSURE: 80 MMHG | BODY MASS INDEX: 24.59 KG/M2 | HEIGHT: 66 IN | HEART RATE: 103 BPM | OXYGEN SATURATION: 99 %

## 2019-05-15 DIAGNOSIS — M79.10 MYALGIA, UNSPECIFIED SITE: Primary | ICD-10-CM

## 2019-05-15 PROCEDURE — 99213 OFFICE O/P EST LOW 20 MIN: CPT | Performed by: PSYCHIATRY & NEUROLOGY

## 2019-05-15 RX ORDER — ETODOLAC 400 MG/1
TABLET, FILM COATED ORAL
COMMUNITY
Start: 2019-05-14 | End: 2021-03-12

## 2019-05-15 NOTE — PROGRESS NOTES
Subjective:     Patient ID: Saray Green is a 53 y.o. female.    History of Present Illness  The following portions of the patient's history were reviewed and updated as appropriate: allergies, current medications, past family history, past medical history, past social history, past surgical history and problem list.    I saw this patient about a year ago for benign positional vertigo that improved with meclizine.    There was a family history of aneurysm.  She underwent brain and neck MRA which were unremarkable.    She had some speech concerns, occasionally mixing up words.  None of this was noted on exam.  A review of the MRI studies from her spine showed small protrusions in the thoracic region and a small annular tear at L4-L5.    TODAY: noted quite a bit of joint pain, knees and elbows, and tendinitis. LAB was normal for CK and aldolase.  She was concerned about possible polymyositis One of the RP antibody levels high, so went to see Dr. Tomlinson but no firm diagnosis yet of MCTD  She has pain in bilateral hamstrings and some tendinitis but not radicular symptoms.  SI joints look fine on plain x-rays    Lab work also showed an M spike on SPEP and hematology is now going to be involved.  She has not yet had immunofixation studies    She has bone-on-bone arthritis in 1 of her knees.  She has had bilateral shoulder surgeries for unrelated joint issues years ago.  The other review of systems discussed  Review of Systems   Constitutional: Negative for activity change, appetite change and fatigue.   HENT: Negative for ear pain, facial swelling and trouble swallowing.    Eyes: Negative for photophobia, pain and visual disturbance.   Respiratory: Negative for choking, chest tightness and shortness of breath.    Cardiovascular: Negative for chest pain, palpitations and leg swelling.   Gastrointestinal: Negative for abdominal pain, constipation and nausea.   Endocrine: Negative for polydipsia, polyphagia and polyuria.    Genitourinary: Negative for difficulty urinating, frequency and urgency.   Musculoskeletal: Positive for back pain and neck pain. Negative for gait problem.   Skin: Negative for color change, rash and wound.   Allergic/Immunologic: Negative for environmental allergies, food allergies and immunocompromised state.   Neurological: Negative for dizziness, tremors, seizures, syncope, facial asymmetry, speech difficulty, weakness, light-headedness, numbness and headaches.   Hematological: Negative for adenopathy. Does not bruise/bleed easily.   Psychiatric/Behavioral: Positive for sleep disturbance. Negative for agitation, behavioral problems, confusion, decreased concentration, dysphoric mood, hallucinations, self-injury and suicidal ideas. The patient is not nervous/anxious and is not hyperactive.         Objective:    Neurologic Exam     Mental Status   Oriented to person, place, and time.   Attention: normal. Concentration: normal.   Speech: speech is normal     Cranial Nerves     CN II   Visual fields full to confrontation.     CN VII   Facial expression full, symmetric.     CN VIII   Hearing: intact  Normal fundi     Motor Exam   Muscle bulk: normal  Overall muscle tone: normal  Left arm pronator drift: absent  Right leg tone: normal  Left leg tone: normalLower extremities: Not tender.  No definite atrophy.  No weakness at the hips knees ankles.     Sensory Exam   Light touch normal.   Vibration normal.     Gait, Coordination, and Reflexes     Gait  Gait: normal    Reflexes   Right patellar: 2+  Left patellar: 2+  Right achilles: 2+  Left achilles: 2+      Physical Exam   Constitutional: She is oriented to person, place, and time. She appears well-developed and well-nourished.   Cardiovascular: Normal rate.   Pulmonary/Chest: Effort normal.   Neurological: She is oriented to person, place, and time. Gait normal.   Reflex Scores:       Patellar reflexes are 2+ on the right side and 2+ on the left side.        Achilles reflexes are 2+ on the right side and 2+ on the left side.  Psychiatric: She has a normal mood and affect. Her speech is normal and behavior is normal. Judgment and thought content normal.   Nursing note and vitals reviewed.      Assessment/Plan:       Problems Addressed this Visit        Unprioritized    Myalgia, unspecified site - Primary               There is no evidence of a neuropathy or myopathy on today's examination.  I agree with plans to continue to follow-up with Dr. Tomlinson to determine if she has mixed connective tissue disease.  No follow-up scheduled.

## 2019-05-16 ENCOUNTER — LAB (OUTPATIENT)
Dept: INTERNAL MEDICINE | Facility: CLINIC | Age: 54
End: 2019-05-16

## 2019-05-16 DIAGNOSIS — R53.82 CHRONIC FATIGUE: Primary | ICD-10-CM

## 2019-05-16 DIAGNOSIS — R53.82 CHRONIC FATIGUE: ICD-10-CM

## 2019-05-17 LAB
25(OH)D3+25(OH)D2 SERPL-MCNC: 47.1 NG/ML (ref 30–100)
ALBUMIN SERPL-MCNC: 4.5 G/DL (ref 3.5–5.2)
ALBUMIN/GLOB SERPL: 2 G/DL
ALP SERPL-CCNC: 58 U/L (ref 39–117)
ALT SERPL-CCNC: 23 U/L (ref 1–33)
AST SERPL-CCNC: 18 U/L (ref 1–32)
BASOPHILS # BLD AUTO: 0.03 10*3/MM3 (ref 0–0.2)
BASOPHILS NFR BLD AUTO: 0.5 % (ref 0–1.5)
BILIRUB SERPL-MCNC: 0.6 MG/DL (ref 0.2–1.2)
BUN SERPL-MCNC: 14 MG/DL (ref 6–20)
BUN/CREAT SERPL: 15.9 (ref 7–25)
CALCIUM SERPL-MCNC: 9.8 MG/DL (ref 8.6–10.5)
CHLORIDE SERPL-SCNC: 102 MMOL/L (ref 98–107)
CHOLEST SERPL-MCNC: 168 MG/DL (ref 0–200)
CO2 SERPL-SCNC: 24.2 MMOL/L (ref 22–29)
CREAT SERPL-MCNC: 0.88 MG/DL (ref 0.57–1)
EOSINOPHIL # BLD AUTO: 0.23 10*3/MM3 (ref 0–0.4)
EOSINOPHIL NFR BLD AUTO: 3.5 % (ref 0.3–6.2)
ERYTHROCYTE [DISTWIDTH] IN BLOOD BY AUTOMATED COUNT: 12.3 % (ref 12.3–15.4)
GLOBULIN SER CALC-MCNC: 2.3 GM/DL
GLUCOSE SERPL-MCNC: 89 MG/DL (ref 65–99)
HCT VFR BLD AUTO: 45.5 % (ref 34–46.6)
HCV AB S/CO SERPL IA: 0.1 S/CO RATIO (ref 0–0.9)
HDLC SERPL-MCNC: 59 MG/DL (ref 40–60)
HGB BLD-MCNC: 14.7 G/DL (ref 12–15.9)
IMM GRANULOCYTES # BLD AUTO: 0.01 10*3/MM3 (ref 0–0.05)
IMM GRANULOCYTES NFR BLD AUTO: 0.2 % (ref 0–0.5)
LDLC SERPL CALC-MCNC: 94 MG/DL (ref 0–100)
LDLC/HDLC SERPL: 1.59 {RATIO}
LYMPHOCYTES # BLD AUTO: 2.15 10*3/MM3 (ref 0.7–3.1)
LYMPHOCYTES NFR BLD AUTO: 32.5 % (ref 19.6–45.3)
MCH RBC QN AUTO: 31.2 PG (ref 26.6–33)
MCHC RBC AUTO-ENTMCNC: 32.3 G/DL (ref 31.5–35.7)
MCV RBC AUTO: 96.6 FL (ref 79–97)
MONOCYTES # BLD AUTO: 0.55 10*3/MM3 (ref 0.1–0.9)
MONOCYTES NFR BLD AUTO: 8.3 % (ref 5–12)
NEUTROPHILS # BLD AUTO: 3.64 10*3/MM3 (ref 1.7–7)
NEUTROPHILS NFR BLD AUTO: 55 % (ref 42.7–76)
NRBC BLD AUTO-RTO: 0 /100 WBC (ref 0–0.2)
PLATELET # BLD AUTO: 322 10*3/MM3 (ref 140–450)
POTASSIUM SERPL-SCNC: 4.4 MMOL/L (ref 3.5–5.2)
PROT SERPL-MCNC: 6.8 G/DL (ref 6–8.5)
RBC # BLD AUTO: 4.71 10*6/MM3 (ref 3.77–5.28)
SODIUM SERPL-SCNC: 140 MMOL/L (ref 136–145)
TRIGL SERPL-MCNC: 76 MG/DL (ref 0–150)
VLDLC SERPL CALC-MCNC: 15.2 MG/DL
WBC # BLD AUTO: 6.61 10*3/MM3 (ref 3.4–10.8)

## 2019-05-23 ENCOUNTER — TELEPHONE (OUTPATIENT)
Dept: INTERNAL MEDICINE | Facility: CLINIC | Age: 54
End: 2019-05-23

## 2019-05-28 ENCOUNTER — APPOINTMENT (OUTPATIENT)
Dept: WOMENS IMAGING | Facility: HOSPITAL | Age: 54
End: 2019-05-28

## 2019-05-28 PROCEDURE — 76942 ECHO GUIDE FOR BIOPSY: CPT | Performed by: RADIOLOGY

## 2019-05-28 PROCEDURE — MDREVIEWSP: Performed by: RADIOLOGY

## 2019-05-28 PROCEDURE — 19000 PUNCTURE ASPIR CYST BREAST: CPT | Performed by: RADIOLOGY

## 2019-05-28 PROCEDURE — 77067 SCR MAMMO BI INCL CAD: CPT | Performed by: RADIOLOGY

## 2019-05-28 PROCEDURE — 77063 BREAST TOMOSYNTHESIS BI: CPT | Performed by: RADIOLOGY

## 2019-05-28 PROCEDURE — 76641 ULTRASOUND BREAST COMPLETE: CPT | Performed by: RADIOLOGY

## 2019-05-28 PROCEDURE — 19001 PUNCTURE ASPIR CYST BRST EA: CPT | Performed by: RADIOLOGY

## 2019-05-29 DIAGNOSIS — F51.01 PRIMARY INSOMNIA: ICD-10-CM

## 2019-05-29 RX ORDER — HYDROXYZINE HYDROCHLORIDE 10 MG/1
TABLET, FILM COATED ORAL
Qty: 30 TABLET | Refills: 0 | Status: SHIPPED | OUTPATIENT
Start: 2019-05-29 | End: 2020-07-14

## 2019-07-18 ENCOUNTER — OFFICE VISIT (OUTPATIENT)
Dept: INTERNAL MEDICINE | Facility: CLINIC | Age: 54
End: 2019-07-18

## 2019-07-18 VITALS
HEART RATE: 84 BPM | OXYGEN SATURATION: 99 % | WEIGHT: 154 LBS | DIASTOLIC BLOOD PRESSURE: 88 MMHG | BODY MASS INDEX: 24.75 KG/M2 | SYSTOLIC BLOOD PRESSURE: 135 MMHG | HEIGHT: 66 IN | TEMPERATURE: 97.5 F

## 2019-07-18 DIAGNOSIS — E55.9 VITAMIN D DEFICIENCY: ICD-10-CM

## 2019-07-18 DIAGNOSIS — E78.2 MIXED HYPERLIPIDEMIA: Primary | ICD-10-CM

## 2019-07-18 DIAGNOSIS — M79.10 MYALGIA, UNSPECIFIED SITE: ICD-10-CM

## 2019-07-18 DIAGNOSIS — M35.9 AUTOIMMUNE DISEASE (HCC): ICD-10-CM

## 2019-07-18 PROBLEM — Z79.899 ENCOUNTER FOR LONG-TERM (CURRENT) DRUG USE: Status: ACTIVE | Noted: 2019-07-18

## 2019-07-18 PROCEDURE — 99213 OFFICE O/P EST LOW 20 MIN: CPT | Performed by: FAMILY MEDICINE

## 2019-07-18 RX ORDER — ETODOLAC 500 MG/1
TABLET, FILM COATED ORAL
Refills: 10 | COMMUNITY
Start: 2019-07-15 | End: 2021-03-12

## 2019-07-19 NOTE — PROGRESS NOTES
CC:? multple myeloma,vit d deficiency, autimmune dis    Subjective.../HPI  Patient present today with11) / autoimmune dis -having w/u Voltaren helps a lot  2)? r5e Multiple myeloma    I have reviewed the patient's medical history in detail and updated the computerized patient record.    Past Medical History:   Diagnosis Date   • Cardiomyopathy (CMS/HCC)     0CT 1999   OFF MEDS AFTER RESOLVED   HOLTER MONITOR APRIL 2016   • Heart palpitations     COMES AND GOES  24 HOLTER DONE 4-13-16   NO RESULTS YET   • History of migraine    • Migraine    • Osteoarthritis    • Seasonal allergies    • Shoulder pain, right        Past Surgical History:   Procedure Laterality Date   • D&C HYSTEROSCOPY WITH NOVASURE ENDOMETRIAL ABLATION AND MYOSURE     • DILATATION AND CURETTAGE      COUPLE   • KNEE ARTHROSCOPY      AGE 11   • SHOULDER ACROMIOPLASTY Left     X 2   • SHOULDER ARTHROSCOPY Right 4/28/2016    Procedure: RT SHOULDER ARTHROSCOPY WITH ACROMIOPLSTY, LABRAL DEBRIDEMENT  AND DISTAL CLAVICLE EXCISION;  Surgeon: Rikki Morel MD;  Location: [facility] OR [facility] [place];  Service:        Family History   Problem Relation Age of Onset   • Hypertension Mother    • Diabetes Mother    • Cancer Mother    • Thyroid disease Mother    • Hypertension Father    • Aneurysm Father    • Stroke Paternal Grandfather    • Cancer Paternal Grandfather        Social History     Socioeconomic History   • Marital status:      Spouse name: Not on file   • Number of children: Not on file   • Years of education: Not on file   • Highest education level: Not on file   Tobacco Use   • Smoking status: Never Smoker   • Smokeless tobacco: Never Used   Substance and Sexual Activity   • Alcohol use: Yes     Comment: SELDOM   • Drug use: No   • Sexual activity: Defer         There is no immunization history on file for this patient.    Review of Systems:   Review of Systems   Constitutional: Negative.    HENT: Negative.    Eyes: Negative.    Respiratory: Negative.   "  Cardiovascular: Negative.    Gastrointestinal: Negative.    Endocrine: Negative.    Genitourinary: Negative.    Musculoskeletal: Negative.    Skin: Negative.    Allergic/Immunologic: Negative.    Neurological: Negative.    Hematological: Negative.    Psychiatric/Behavioral: Negative.          Physical Exam   Constitutional: She is oriented to person, place, and time. She appears well-developed and well-nourished.   Cardiovascular: Normal rate, regular rhythm and normal heart sounds.   Pulmonary/Chest: Effort normal and breath sounds normal.   Neurological: She is alert and oriented to person, place, and time.   Psychiatric: She has a normal mood and affect. Her behavior is normal.   Vitals reviewed.        Vital Signs     Vitals:    07/18/19 1825   BP: 135/88   BP Location: Left arm   Patient Position: Sitting   Cuff Size: Small Adult   Pulse: 84   Temp: 97.5 °F (36.4 °C)   TempSrc: Oral   SpO2: 99%   Weight: 69.9 kg (154 lb)   Height: 167.6 cm (65.98\")          Results Review:      REVIEWED AND DISCUSSED CLINICAL RESULTS WITH PATIENT      Requested Prescriptions      No prescriptions requested or ordered in this encounter         Current Outpatient Medications:   •  atorvastatin (LIPITOR) 10 MG tablet, Take 1 tablet by mouth Daily., Disp: 30 tablet, Rfl: 6  •  escitalopram (LEXAPRO) 20 MG tablet, Take 1 tablet by mouth Daily. Brand name only, Disp: 90 tablet, Rfl: 1  •  etodolac (LODINE) 400 MG tablet, , Disp: , Rfl:   •  etodolac (LODINE) 500 MG tablet, TK 1 T PO BID WF PRN, Disp: , Rfl: 10  •  vitamin D (ERGOCALCIFEROL) 39119 units capsule capsule, Take 1 capsule by mouth 1 (One) Time Per Week., Disp: 30 capsule, Rfl: 6  •  hydrOXYzine (ATARAX) 10 MG tablet, TAKE 1 TABLET BY MOUTH AT NIGHT AS NEEDED FOR INSOMNIA OR ANXIETY, Disp: 30 tablet, Rfl: 0    Procedures          Diagnoses and all orders for this visit:    Mixed hyperlipidemia  -     Lipid Panel With LDL / HDL Ratio; Future  -     Comprehensive Metabolic " Panel; Future    Vitamin D deficiency  -     Vitamin D 25 Hydroxy; Future    Autoimmune disease (CMS/HCC)  -     Comprehensive Metabolic Panel; Future  -     CBC & Differential; Future    Myalgia, unspecified site  -     Measles / Mumps / Rubella Immunity; Future    Other orders  -     etodolac (LODINE) 500 MG tablet; TK 1 T PO BID WF PRN        There are no Patient Instructions on file for this visit.     Return in about 3 months (around 10/18/2019) for Recheck.    Luis Fleming M.D  07/18/19  8:19 PM

## 2019-07-23 ENCOUNTER — RESULTS ENCOUNTER (OUTPATIENT)
Dept: INTERNAL MEDICINE | Facility: CLINIC | Age: 54
End: 2019-07-23

## 2019-07-23 DIAGNOSIS — M79.10 MYALGIA, UNSPECIFIED SITE: ICD-10-CM

## 2019-07-23 DIAGNOSIS — M35.9 AUTOIMMUNE DISEASE (HCC): ICD-10-CM

## 2019-07-23 DIAGNOSIS — E55.9 VITAMIN D DEFICIENCY: ICD-10-CM

## 2019-07-23 DIAGNOSIS — E78.2 MIXED HYPERLIPIDEMIA: ICD-10-CM

## 2019-07-26 DIAGNOSIS — F42.2 MIXED OBSESSIONAL THOUGHTS AND ACTS: ICD-10-CM

## 2019-07-26 RX ORDER — ESCITALOPRAM OXALATE 20 MG/1
TABLET ORAL
Qty: 90 TABLET | Refills: 0 | Status: SHIPPED | OUTPATIENT
Start: 2019-07-26 | End: 2019-09-27 | Stop reason: SDUPTHER

## 2019-08-27 LAB
25(OH)D3+25(OH)D2 SERPL-MCNC: 45.2 NG/ML (ref 30–100)
ALBUMIN SERPL-MCNC: 4.4 G/DL (ref 3.5–5.2)
ALBUMIN/GLOB SERPL: 1.9 G/DL
ALP SERPL-CCNC: 58 U/L (ref 39–117)
ALT SERPL-CCNC: 21 U/L (ref 1–33)
AST SERPL-CCNC: 18 U/L (ref 1–32)
BASOPHILS # BLD AUTO: 0.04 10*3/MM3 (ref 0–0.2)
BASOPHILS NFR BLD AUTO: 0.5 % (ref 0–1.5)
BILIRUB SERPL-MCNC: 0.3 MG/DL (ref 0.2–1.2)
BUN SERPL-MCNC: 20 MG/DL (ref 6–20)
BUN/CREAT SERPL: 16.5 (ref 7–25)
CALCIUM SERPL-MCNC: 9.1 MG/DL (ref 8.6–10.5)
CHLORIDE SERPL-SCNC: 101 MMOL/L (ref 98–107)
CHOLEST SERPL-MCNC: 199 MG/DL (ref 0–200)
CO2 SERPL-SCNC: 24.3 MMOL/L (ref 22–29)
CREAT SERPL-MCNC: 1.21 MG/DL (ref 0.57–1)
EOSINOPHIL # BLD AUTO: 0.28 10*3/MM3 (ref 0–0.4)
EOSINOPHIL NFR BLD AUTO: 3.6 % (ref 0.3–6.2)
ERYTHROCYTE [DISTWIDTH] IN BLOOD BY AUTOMATED COUNT: 12.9 % (ref 12.3–15.4)
GLOBULIN SER CALC-MCNC: 2.3 GM/DL
GLUCOSE SERPL-MCNC: 79 MG/DL (ref 65–99)
HCT VFR BLD AUTO: 42.7 % (ref 34–46.6)
HDLC SERPL-MCNC: 59 MG/DL (ref 40–60)
HGB BLD-MCNC: 13.9 G/DL (ref 12–15.9)
IMM GRANULOCYTES # BLD AUTO: 0.03 10*3/MM3 (ref 0–0.05)
IMM GRANULOCYTES NFR BLD AUTO: 0.4 % (ref 0–0.5)
LDLC SERPL CALC-MCNC: 110 MG/DL (ref 0–100)
LDLC/HDLC SERPL: 1.86 {RATIO}
LYMPHOCYTES # BLD AUTO: 2.32 10*3/MM3 (ref 0.7–3.1)
LYMPHOCYTES NFR BLD AUTO: 29.9 % (ref 19.6–45.3)
MCH RBC QN AUTO: 30.8 PG (ref 26.6–33)
MCHC RBC AUTO-ENTMCNC: 32.6 G/DL (ref 31.5–35.7)
MCV RBC AUTO: 94.7 FL (ref 79–97)
MONOCYTES # BLD AUTO: 0.52 10*3/MM3 (ref 0.1–0.9)
MONOCYTES NFR BLD AUTO: 6.7 % (ref 5–12)
NEUTROPHILS # BLD AUTO: 4.58 10*3/MM3 (ref 1.7–7)
NEUTROPHILS NFR BLD AUTO: 58.9 % (ref 42.7–76)
NRBC BLD AUTO-RTO: 0 /100 WBC (ref 0–0.2)
PLATELET # BLD AUTO: 293 10*3/MM3 (ref 140–450)
POTASSIUM SERPL-SCNC: 4.4 MMOL/L (ref 3.5–5.2)
PROT SERPL-MCNC: 6.7 G/DL (ref 6–8.5)
RBC # BLD AUTO: 4.51 10*6/MM3 (ref 3.77–5.28)
SODIUM SERPL-SCNC: 138 MMOL/L (ref 136–145)
TRIGL SERPL-MCNC: 150 MG/DL (ref 0–150)
VLDLC SERPL CALC-MCNC: 30 MG/DL
WBC # BLD AUTO: 7.77 10*3/MM3 (ref 3.4–10.8)

## 2019-09-10 ENCOUNTER — TELEPHONE (OUTPATIENT)
Dept: INTERNAL MEDICINE | Facility: CLINIC | Age: 54
End: 2019-09-10

## 2019-09-10 DIAGNOSIS — Z87.448 HISTORY OF ACUTE RENAL FAILURE: ICD-10-CM

## 2019-09-10 DIAGNOSIS — Z01.84 IMMUNITY TO MEASLES, MUMPS, AND RUBELLA DETERMINED BY SEROLOGIC TEST: Primary | ICD-10-CM

## 2019-09-13 LAB
BUN SERPL-MCNC: 17 MG/DL (ref 6–20)
BUN/CREAT SERPL: 18.1 (ref 7–25)
CALCIUM SERPL-MCNC: 8.9 MG/DL (ref 8.6–10.5)
CHLORIDE SERPL-SCNC: 102 MMOL/L (ref 98–107)
CO2 SERPL-SCNC: 25.9 MMOL/L (ref 22–29)
CREAT SERPL-MCNC: 0.94 MG/DL (ref 0.57–1)
GLUCOSE SERPL-MCNC: 88 MG/DL (ref 65–99)
MEV IGG SER IA-ACNC: 248 AU/ML
MUV IGG SER IA-ACNC: 223 AU/ML
POTASSIUM SERPL-SCNC: 4.4 MMOL/L (ref 3.5–5.2)
RUBV IGG SERPL IA-ACNC: 5.17 INDEX
SODIUM SERPL-SCNC: 141 MMOL/L (ref 136–145)

## 2019-09-27 DIAGNOSIS — F42.2 MIXED OBSESSIONAL THOUGHTS AND ACTS: ICD-10-CM

## 2019-09-27 RX ORDER — ESCITALOPRAM OXALATE 20 MG/1
TABLET ORAL
Qty: 90 TABLET | Refills: 0 | Status: SHIPPED | OUTPATIENT
Start: 2019-09-27 | End: 2019-10-22 | Stop reason: SDUPTHER

## 2019-09-27 RX ORDER — ATORVASTATIN CALCIUM 10 MG/1
10 TABLET, FILM COATED ORAL DAILY
Qty: 90 TABLET | Refills: 0 | Status: SHIPPED | OUTPATIENT
Start: 2019-09-27 | End: 2019-10-22 | Stop reason: SDUPTHER

## 2019-09-27 RX ORDER — ATORVASTATIN CALCIUM 10 MG/1
10 TABLET, FILM COATED ORAL DAILY
Qty: 30 TABLET | Refills: 0 | Status: SHIPPED | OUTPATIENT
Start: 2019-09-27 | End: 2019-09-27 | Stop reason: SDUPTHER

## 2019-10-22 ENCOUNTER — OFFICE VISIT (OUTPATIENT)
Dept: INTERNAL MEDICINE | Facility: CLINIC | Age: 54
End: 2019-10-22

## 2019-10-22 VITALS
HEART RATE: 70 BPM | WEIGHT: 169 LBS | BODY MASS INDEX: 27.16 KG/M2 | TEMPERATURE: 97.7 F | OXYGEN SATURATION: 99 % | HEIGHT: 66 IN | DIASTOLIC BLOOD PRESSURE: 89 MMHG | SYSTOLIC BLOOD PRESSURE: 135 MMHG

## 2019-10-22 DIAGNOSIS — M19.071 PRIMARY OSTEOARTHRITIS OF BOTH FEET: ICD-10-CM

## 2019-10-22 DIAGNOSIS — F42.2 MIXED OBSESSIONAL THOUGHTS AND ACTS: ICD-10-CM

## 2019-10-22 DIAGNOSIS — E78.2 MIXED HYPERLIPIDEMIA: Primary | ICD-10-CM

## 2019-10-22 DIAGNOSIS — M19.072 PRIMARY OSTEOARTHRITIS OF BOTH FEET: ICD-10-CM

## 2019-10-22 PROBLEM — J30.9 ALLERGIC RHINITIS: Status: ACTIVE | Noted: 2019-10-22

## 2019-10-22 PROCEDURE — 99213 OFFICE O/P EST LOW 20 MIN: CPT | Performed by: FAMILY MEDICINE

## 2019-10-22 RX ORDER — ATORVASTATIN CALCIUM 10 MG/1
10 TABLET, FILM COATED ORAL DAILY
Qty: 90 TABLET | Refills: 3 | Status: SHIPPED | OUTPATIENT
Start: 2019-10-22 | End: 2020-03-17 | Stop reason: SDUPTHER

## 2019-10-22 RX ORDER — DICLOFENAC SODIUM 75 MG/1
TABLET, DELAYED RELEASE ORAL
Refills: 7 | COMMUNITY
Start: 2019-07-26 | End: 2022-09-20

## 2019-10-22 RX ORDER — MONTELUKAST SODIUM 10 MG/1
10 TABLET ORAL NIGHTLY
Qty: 30 TABLET | Refills: 11 | Status: SHIPPED | OUTPATIENT
Start: 2019-10-22 | End: 2022-02-18

## 2019-10-22 RX ORDER — ESCITALOPRAM OXALATE 20 MG/1
20 TABLET ORAL DAILY
Qty: 90 TABLET | Refills: 3 | Status: SHIPPED | OUTPATIENT
Start: 2019-10-22 | End: 2020-11-25 | Stop reason: SDUPTHER

## 2019-10-22 NOTE — PROGRESS NOTES
CC:arthriis,hyperlipidemia,    Subjective.../HPI1)arthritis ok  Patient present today with2) lipids pk Saray Cadet has a history of chronic hyperlipidemia and has been well controlled on current medications.  Patient reports has had hyperlipidemia for 2 years. She is tolerating medications without side effect.  Hyperlipidemia labs will be drawn today.  3) depression good    I have reviewed the patient's medical history in detail and updated the computerized patient record.    Past Medical History:   Diagnosis Date   • Cardiomyopathy (CMS/HCC)     0CT 1999   OFF MEDS AFTER RESOLVED   HOLTER MONITOR APRIL 2016   • Heart palpitations     COMES AND GOES  24 HOLTER DONE 4-13-16   NO RESULTS YET   • History of migraine    • Migraine    • Osteoarthritis    • Seasonal allergies    • Shoulder pain, right        Past Surgical History:   Procedure Laterality Date   • D&C HYSTEROSCOPY WITH NOVASURE ENDOMETRIAL ABLATION AND MYOSURE     • DILATATION AND CURETTAGE      COUPLE   • KNEE ARTHROSCOPY      AGE 11   • SHOULDER ACROMIOPLASTY Left     X 2   • SHOULDER ARTHROSCOPY Right 4/28/2016    Procedure: RT SHOULDER ARTHROSCOPY WITH ACROMIOPLSTY, LABRAL DEBRIDEMENT  AND DISTAL CLAVICLE EXCISION;  Surgeon: Rikki Morel MD;  Location: [facility] OR [facility] [place];  Service:        Family History   Problem Relation Age of Onset   • Hypertension Mother    • Diabetes Mother    • Cancer Mother    • Thyroid disease Mother    • Hypertension Father    • Aneurysm Father    • Stroke Paternal Grandfather    • Cancer Paternal Grandfather        Social History     Socioeconomic History   • Marital status:      Spouse name: Not on file   • Number of children: Not on file   • Years of education: Not on file   • Highest education level: Not on file   Tobacco Use   • Smoking status: Never Smoker   • Smokeless tobacco: Never Used   Substance and Sexual Activity   • Alcohol use: Yes     Comment: SELDOM   • Drug use: No   • Sexual activity: Defer         There  "is no immunization history on file for this patient.    Review of Systems:   Review of Systems   Constitutional: Negative.    HENT: Negative.    Eyes: Negative.    Respiratory: Negative.    Cardiovascular: Negative.    Gastrointestinal: Negative.    Endocrine: Negative.    Genitourinary: Negative.    Musculoskeletal: Negative.    Allergic/Immunologic: Negative.    Neurological: Negative.    Hematological: Negative.    Psychiatric/Behavioral: Negative.          Physical Exam   Constitutional: She is oriented to person, place, and time. She appears well-developed and well-nourished.   Cardiovascular: Normal rate, regular rhythm and normal heart sounds.   Pulmonary/Chest: Effort normal and breath sounds normal.   Neurological: She is alert and oriented to person, place, and time.   Psychiatric: She has a normal mood and affect. Her behavior is normal. Judgment and thought content normal.   Vitals reviewed.        Vital Signs     Vitals:    10/22/19 1241   BP: 135/89   BP Location: Left arm   Patient Position: Sitting   Cuff Size: Small Adult   Pulse: 70   Temp: 97.7 °F (36.5 °C)   TempSrc: Oral   SpO2: 99%   Weight: 76.7 kg (169 lb)   Height: 167.6 cm (65.98\")          Results Review:      REVIEWED AND DISCUSSED CLINICAL RESULTS WITH PATIENT      Requested Prescriptions      No prescriptions requested or ordered in this encounter         Current Outpatient Medications:   •  atorvastatin (LIPITOR) 10 MG tablet, TAKE 1 TABLET BY MOUTH DAILY, Disp: 90 tablet, Rfl: 0  •  diclofenac (VOLTAREN) 75 MG EC tablet, TK 1 T PO BID WF PRN, Disp: , Rfl: 7  •  escitalopram (LEXAPRO) 20 MG tablet, TAKE 1 TABLET BY MOUTH DAILY, Disp: 90 tablet, Rfl: 0  •  vitamin D (ERGOCALCIFEROL) 59537 units capsule capsule, Take 1 capsule by mouth 1 (One) Time Per Week., Disp: 30 capsule, Rfl: 6  •  etodolac (LODINE) 400 MG tablet, , Disp: , Rfl:   •  etodolac (LODINE) 500 MG tablet, TK 1 T PO BID WF PRN, Disp: , Rfl: 10  •  hydrOXYzine (ATARAX) 10 " MG tablet, TAKE 1 TABLET BY MOUTH AT NIGHT AS NEEDED FOR INSOMNIA OR ANXIETY, Disp: 30 tablet, Rfl: 0    Procedures          Diagnoses and all orders for this visit:    Mixed hyperlipidemia    Other orders  -     diclofenac (VOLTAREN) 75 MG EC tablet; TK 1 T PO BID WF PRN        There are no Patient Instructions on file for this visit.     No Follow-up on file.    Luis Fleming M.D  10/22/19  1:31 PM

## 2019-10-23 LAB
ALBUMIN SERPL-MCNC: 4.1 G/DL (ref 3.5–5.5)
ALBUMIN/GLOB SERPL: 1.5 {RATIO} (ref 1.2–2.2)
ALP SERPL-CCNC: 63 IU/L (ref 39–117)
ALT SERPL-CCNC: 15 IU/L (ref 0–32)
AST SERPL-CCNC: 17 IU/L (ref 0–40)
BILIRUB SERPL-MCNC: 0.4 MG/DL (ref 0–1.2)
BUN SERPL-MCNC: 15 MG/DL (ref 6–24)
BUN/CREAT SERPL: 17 (ref 9–23)
CALCIUM SERPL-MCNC: 9 MG/DL (ref 8.7–10.2)
CHLORIDE SERPL-SCNC: 102 MMOL/L (ref 96–106)
CO2 SERPL-SCNC: 23 MMOL/L (ref 20–29)
CREAT SERPL-MCNC: 0.9 MG/DL (ref 0.57–1)
GLOBULIN SER CALC-MCNC: 2.7 G/DL (ref 1.5–4.5)
GLUCOSE SERPL-MCNC: 80 MG/DL (ref 65–99)
POTASSIUM SERPL-SCNC: 4.7 MMOL/L (ref 3.5–5.2)
PROT SERPL-MCNC: 6.8 G/DL (ref 6–8.5)
SODIUM SERPL-SCNC: 139 MMOL/L (ref 134–144)

## 2019-11-11 ENCOUNTER — OFFICE VISIT (OUTPATIENT)
Dept: NEUROLOGY | Facility: CLINIC | Age: 54
End: 2019-11-11

## 2019-11-11 VITALS
BODY MASS INDEX: 27.32 KG/M2 | OXYGEN SATURATION: 98 % | HEIGHT: 66 IN | WEIGHT: 170 LBS | HEART RATE: 110 BPM | SYSTOLIC BLOOD PRESSURE: 138 MMHG | DIASTOLIC BLOOD PRESSURE: 88 MMHG

## 2019-11-11 DIAGNOSIS — R20.8 DYSESTHESIA OF MULTIPLE SITES: Primary | ICD-10-CM

## 2019-11-11 PROCEDURE — 99213 OFFICE O/P EST LOW 20 MIN: CPT | Performed by: PSYCHIATRY & NEUROLOGY

## 2019-11-11 RX ORDER — SULFAMETHOXAZOLE AND TRIMETHOPRIM 800; 160 MG/1; MG/1
1 TABLET ORAL 2 TIMES DAILY
Refills: 2 | COMMUNITY
Start: 2019-10-27 | End: 2020-07-14

## 2019-11-11 RX ORDER — AZITHROMYCIN 250 MG/1
TABLET, FILM COATED ORAL
Refills: 0 | COMMUNITY
Start: 2019-11-08 | End: 2020-07-14

## 2019-11-11 NOTE — PROGRESS NOTES
"Subjective:     Patient ID: Saray Green is a 54 y.o. female.    History of Present Illness  The following portions of the patient's history were reviewed and updated as appropriate: allergies, current medications, past family history, past medical history, past social history, past surgical history and problem list.    Dysesthesias:    - sx of dysesthesias, comes and goes for 1-5 min, 10-15 x/day. Isolated to left long finger and at other times, the toes # 2 and 3 on left foot. Rare sx on other parts of the body, including some of the right foot or the perioral area of the face.  The dysesthesias were described as a 'Burning\" sx, that hurts.     Other visits at this office have been for vertigo which no longer is a problem after treatment with meclizine.   There is a history of chronic hyperlipidemia, past history of migraine, and a history of cardiomyopathy 20 years ago which resolved    Mixed connective tissue disease:   She was  evaluated by Dr. Tomlinson for joint pain with a diagnosis of positive CAL, negative double-stranded, positive RNP with a possible diagnosis of mixed connective tissue disease diagnosis.     CPK and aldolase were normal.  She responded to Voltaren but had elevated liver enzymes and changed medicines. Now, LFTs are OK.      Monoclonal gammopathy:  She is being followed by Dr. Jhonny Nova due to M spike on serum protein electrophoresis, with ultimate diagnosis of MGUS>.  She also has monoclonal paraproteinemia, primary.  Follow-up includes immunoglobulins immunofixation and CBC. No diagnosis of myeloma on bone marrow bx.       There is a past history of bilateral shoulder surgeries for joint issues many years ago.  Review of Systems   Constitutional: Negative for activity change, appetite change and fatigue.   HENT: Negative for ear pain, facial swelling and trouble swallowing.    Eyes: Negative for photophobia, pain and visual disturbance.   Respiratory: Negative for cough, chest " tightness and shortness of breath.    Cardiovascular: Negative for chest pain, palpitations and leg swelling.   Gastrointestinal: Negative for abdominal pain, nausea and vomiting.   Endocrine: Negative for cold intolerance, heat intolerance and polydipsia.   Musculoskeletal: Negative for back pain, gait problem and neck pain.   Skin: Negative for color change, rash and wound.   Allergic/Immunologic: Negative for environmental allergies, food allergies and immunocompromised state.   Neurological: Positive for numbness (more of a burning sensation in few fingers and toes and top of lip). Negative for dizziness, tremors, seizures, syncope, facial asymmetry, speech difficulty, weakness, light-headedness and headaches.   Hematological: Negative for adenopathy. Does not bruise/bleed easily.   Psychiatric/Behavioral: Negative for agitation, behavioral problems, confusion, decreased concentration, dysphoric mood, hallucinations, self-injury, sleep disturbance and suicidal ideas. The patient is not nervous/anxious and is not hyperactive.         Objective:  Neurologic Exam      Mental Status   Oriented to person, place, and time.   Attention: normal. Concentration: normal.   Speech: speech is normal      Cranial Nerves      CN II   Visual fields full   CN VII   Facial expression full, symmetric.      CN VIII   Hearing: intact   space     Motor Exam   Muscle bulk: normal  Overall muscle tone: normal   grade 5 strength all limbs   no limb pain           Sensory Exam : Hands, digits, toes-  Light touch normal.   Vibration normal.   Pinprick normal  No Tinel's sign present over the anterior tarsal tunnel and negative Wartenberg's sign for radial neuropathy     Gait, Coordination, and Reflexes      Gait  Gait: normal     Reflexes   Right patellar: 2+  Left patellar: 2+  Right achilles: 2+  Left achilles: 2+        Physical Exam   Constitutional: She is oriented to person, place, and time. She appears well-developed and  well-nourished.   Cardiovascular: Normal rate.   Pulmonary/Chest: Effort normal.     No limb edema  Psychiatric: She has a normal mood and affect. Her speech is normal and behavior is normal. Judgment and thought content normal.   Nursing note and vitals reviewed.       Assessment/Plan:       Problems Addressed this Visit     None      Visit Diagnoses     Dysesthesia of multiple sites    -  Primary             Her exam remains normal.  She complains of intermittent dysesthesias affecting the dorsum of the right long finger and 2 toes of the left foot.  The symptoms appear in a sharply demarcated region of the finger and foot reminiscent of nerve related symptomatology.    There is no sensory loss to indicate neuropathy mononeuropathy peripheral neuropathy.  I note that MGUS can cause peripheral neuropathy as can some connective tissue diseases..    It is possible that some swelling related to joint pain could irritate or partially entrap peripheral nerves causing burning but this was only a theoretical discussion.      A trial of a topical anti-inflammatory near the base of the left long finger or the dorsum of the left foot could be worth a try and I gave her a handout from the [facility] pharmacy of topical medicines that could be useful.  Capsaicin 0.025% may also help.    I do not think she needs nerve conduction tests for neuropathy based upon her negative exam findings but of asked her to follow-up with Dr. Juan C Parra next fall to double check that there is no neuropathy present

## 2019-11-19 ENCOUNTER — APPOINTMENT (OUTPATIENT)
Dept: WOMENS IMAGING | Facility: HOSPITAL | Age: 54
End: 2019-11-19

## 2019-11-19 PROCEDURE — 77066 DX MAMMO INCL CAD BI: CPT | Performed by: RADIOLOGY

## 2019-11-19 PROCEDURE — 76641 ULTRASOUND BREAST COMPLETE: CPT | Performed by: RADIOLOGY

## 2019-11-19 PROCEDURE — MDREVIEWSP: Performed by: RADIOLOGY

## 2019-11-19 PROCEDURE — G0279 TOMOSYNTHESIS, MAMMO: HCPCS | Performed by: RADIOLOGY

## 2019-11-19 PROCEDURE — 77062 BREAST TOMOSYNTHESIS BI: CPT | Performed by: RADIOLOGY

## 2019-12-02 ENCOUNTER — TELEPHONE (OUTPATIENT)
Dept: NEUROLOGY | Facility: CLINIC | Age: 54
End: 2019-12-02

## 2019-12-02 NOTE — TELEPHONE ENCOUNTER
----- Message from Kassi Mosqueda sent at 12/2/2019 12:38 PM EST -----  Contact: [phone]  Marnie called to leave a message for Dr Barrios. She wanted to let him know that the burning in her fingers have gotten worse. She stated that he wanted her to call him with any changes. Marnie can be reached at [phone]. Thanks so much.

## 2019-12-04 ENCOUNTER — TELEPHONE (OUTPATIENT)
Dept: NEUROLOGY | Facility: CLINIC | Age: 54
End: 2019-12-04

## 2019-12-10 ENCOUNTER — TELEPHONE (OUTPATIENT)
Dept: NEUROLOGY | Facility: CLINIC | Age: 54
End: 2019-12-10

## 2019-12-10 DIAGNOSIS — R20.8 DYSESTHESIA OF MULTIPLE SITES: Primary | ICD-10-CM

## 2019-12-11 NOTE — TELEPHONE ENCOUNTER
I returned a phone call.  I reviewed the most recent office note.  She still has sensory symptoms.  She states that in her hands grabbing or reaching out a cough will trigger the symptoms which we thought were possibly joint related from swelling.  She question whether could be neuropathy from mgus.  Sometimes the symptoms affect the little fingers right hand more than left.      I agreed to schedule her for nerve conduction studies of the upper extremities to rule out sensory neuropathy.

## 2019-12-19 ENCOUNTER — PROCEDURE VISIT (OUTPATIENT)
Dept: NEUROLOGY | Facility: CLINIC | Age: 54
End: 2019-12-19

## 2019-12-19 VITALS — WEIGHT: 170 LBS | BODY MASS INDEX: 28.32 KG/M2 | HEIGHT: 65 IN

## 2019-12-19 DIAGNOSIS — R20.8 DYSESTHESIA OF MULTIPLE SITES: ICD-10-CM

## 2019-12-19 PROCEDURE — 95911 NRV CNDJ TEST 9-10 STUDIES: CPT | Performed by: PSYCHIATRY & NEUROLOGY

## 2019-12-19 PROCEDURE — 95886 MUSC TEST DONE W/N TEST COMP: CPT | Performed by: PSYCHIATRY & NEUROLOGY

## 2019-12-19 NOTE — PROGRESS NOTES
EMG and Nerve Conduction Studies    I.      Instrument used: Neuromax 1002  II.     Please see data sheets for tabular summary of NCS and details on methods, temperatures and lab standards.   III.    EMG muscles tested for upper extremity studies include the deltoid, biceps, triceps, pronator teres, extensor digitorum communis, first dorsal interosseous and abductor pollicis brevis.    IV.   EMG muscles tested for lower extremity studies include the vastus lateralis, tibialis anterior, peroneus longus, medial gastrocnemius and extensor digitorum brevis.    V.    Additional muscles tested as needed.  Paraspinal muscles tested as needed.   VI.   Please see data sheets for tabular summary of EMG findings.   VII. The complete report includes the data sheets.      Indication: Intermittent burning pains in the hands mostly the ulnar aspect of the right hand plus the dorsal radial aspect of the right hand and more median aspect of the left hand.  History: 54-year-old woman with intermittent burning in the hands mostly in the right ulnar and radial distribution and left median distribution.  She has possible mixed connective tissue disease and a low concentration IgG kappa MGUS      Ht: 165.1 cm  Wt: 77.1 kg; BMI 28.3  HbA1C: No results found for: HGBA1C  TSH:   Lab Results   Component Value Date    TSH 2.160 01/31/2019       Technical summary:  Nerve conduction studies were obtained in both arms.  Skin temperatures were at least 32 °C measured on the palms after warming the hands.  Needle examination was obtained on selected muscles in both arms.    Results:  1.  Normal median sensory studies bilaterally.  2.  Normal ulnar sensory studies bilaterally.  3.  Normal right radial sensory study.  4.  Normal  median motor studies bilaterally.  5.  Normal ulnar motor studies bilaterally.  6.  Needle examination of selected muscles in both arm showed normal insertional activities throughout.  There were normal motor units and  recruitment patterns    Impression:  Normal study.  No evidence of a median or ulnar neuropathy or cervical radiculopathy on either side by this study.  In addition no evidence of a right radial sensory neuropathy by this study.  Study results were discussed with the patient.    Juan C Parra M.D.              Dictated utilizing Dragon dictation.

## 2020-01-13 ENCOUNTER — TRANSCRIBE ORDERS (OUTPATIENT)
Dept: ADMINISTRATIVE | Facility: HOSPITAL | Age: 55
End: 2020-01-13

## 2020-01-13 DIAGNOSIS — R13.10 PROBLEMS WITH SWALLOWING AND MASTICATION: Primary | ICD-10-CM

## 2020-01-21 ENCOUNTER — HOSPITAL ENCOUNTER (OUTPATIENT)
Dept: GENERAL RADIOLOGY | Facility: HOSPITAL | Age: 55
Discharge: HOME | End: 2020-01-21
Admitting: INTERNAL MEDICINE

## 2020-01-21 DIAGNOSIS — R13.10 PROBLEMS WITH SWALLOWING AND MASTICATION: ICD-10-CM

## 2020-01-21 PROCEDURE — 74221 X-RAY XM ESOPHAGUS 2CNTRST: CPT

## 2020-01-21 RX ADMIN — BARIUM SULFATE 700 MG: 700 TABLET ORAL at 11:20

## 2020-01-21 RX ADMIN — ANTACID/ANTIFLATULENT 1 TABLET: 380; 550; 10; 10 GRANULE, EFFERVESCENT ORAL at 11:15

## 2020-01-21 RX ADMIN — BARIUM SULFATE 183 ML: 960 POWDER, FOR SUSPENSION ORAL at 11:25

## 2020-01-21 RX ADMIN — BARIUM SULFATE 135 ML: 980 POWDER, FOR SUSPENSION ORAL at 11:20

## 2020-02-05 RX ORDER — ERGOCALCIFEROL 1.25 MG/1
CAPSULE ORAL
Qty: 13 CAPSULE | Refills: 0 | Status: SHIPPED | OUTPATIENT
Start: 2020-02-05 | End: 2020-04-15

## 2020-02-05 RX ORDER — ERGOCALCIFEROL 1.25 MG/1
CAPSULE ORAL
Qty: 4 CAPSULE | Refills: 2 | Status: SHIPPED | OUTPATIENT
Start: 2020-02-05 | End: 2020-02-05

## 2020-03-17 DIAGNOSIS — E78.2 MIXED HYPERLIPIDEMIA: ICD-10-CM

## 2020-03-17 RX ORDER — ATORVASTATIN CALCIUM 10 MG/1
10 TABLET, FILM COATED ORAL DAILY
Qty: 90 TABLET | Refills: 1 | Status: SHIPPED | OUTPATIENT
Start: 2020-03-17 | End: 2020-09-18

## 2020-04-15 RX ORDER — ERGOCALCIFEROL 1.25 MG/1
CAPSULE ORAL
Qty: 13 CAPSULE | Refills: 0 | Status: SHIPPED | OUTPATIENT
Start: 2020-04-15 | End: 2020-07-17 | Stop reason: SDUPTHER

## 2020-06-02 ENCOUNTER — APPOINTMENT (OUTPATIENT)
Dept: WOMENS IMAGING | Facility: HOSPITAL | Age: 55
End: 2020-06-02

## 2020-06-02 PROCEDURE — MDREVIEWSP: Performed by: RADIOLOGY

## 2020-06-02 PROCEDURE — 76641 ULTRASOUND BREAST COMPLETE: CPT | Performed by: RADIOLOGY

## 2020-06-02 PROCEDURE — 77066 DX MAMMO INCL CAD BI: CPT | Performed by: RADIOLOGY

## 2020-06-02 PROCEDURE — G0279 TOMOSYNTHESIS, MAMMO: HCPCS | Performed by: RADIOLOGY

## 2020-06-02 PROCEDURE — 77062 BREAST TOMOSYNTHESIS BI: CPT | Performed by: RADIOLOGY

## 2020-07-14 ENCOUNTER — OFFICE VISIT (OUTPATIENT)
Dept: NEUROLOGY | Facility: CLINIC | Age: 55
End: 2020-07-14

## 2020-07-14 ENCOUNTER — OFFICE VISIT (OUTPATIENT)
Dept: INTERNAL MEDICINE | Facility: CLINIC | Age: 55
End: 2020-07-14

## 2020-07-14 VITALS
DIASTOLIC BLOOD PRESSURE: 74 MMHG | BODY MASS INDEX: 29.52 KG/M2 | WEIGHT: 177.2 LBS | HEIGHT: 65 IN | OXYGEN SATURATION: 98 % | SYSTOLIC BLOOD PRESSURE: 132 MMHG | HEART RATE: 74 BPM

## 2020-07-14 VITALS
HEART RATE: 75 BPM | HEIGHT: 65 IN | SYSTOLIC BLOOD PRESSURE: 121 MMHG | RESPIRATION RATE: 16 BRPM | DIASTOLIC BLOOD PRESSURE: 81 MMHG | WEIGHT: 177 LBS | TEMPERATURE: 98.2 F | OXYGEN SATURATION: 96 % | BODY MASS INDEX: 29.49 KG/M2

## 2020-07-14 DIAGNOSIS — M79.10 MUSCLE PAIN: ICD-10-CM

## 2020-07-14 DIAGNOSIS — R20.2 PARESTHESIAS: Primary | ICD-10-CM

## 2020-07-14 DIAGNOSIS — E78.2 MIXED HYPERLIPIDEMIA: Primary | ICD-10-CM

## 2020-07-14 DIAGNOSIS — E55.9 VITAMIN D DEFICIENCY: ICD-10-CM

## 2020-07-14 PROBLEM — M51.37 DEGENERATIVE DISC DISEASE AT L5-S1 LEVEL: Status: ACTIVE | Noted: 2019-05-13

## 2020-07-14 PROBLEM — M15.9 OSTEOARTHRITIS INVOLVING MULTIPLE JOINTS ON BOTH SIDES OF BODY: Status: ACTIVE | Noted: 2019-05-13

## 2020-07-14 PROBLEM — M51.379 DEGENERATIVE DISC DISEASE AT L5-S1 LEVEL: Status: ACTIVE | Noted: 2019-05-13

## 2020-07-14 PROCEDURE — 99213 OFFICE O/P EST LOW 20 MIN: CPT | Performed by: NURSE PRACTITIONER

## 2020-07-14 PROCEDURE — 99215 OFFICE O/P EST HI 40 MIN: CPT | Performed by: PSYCHIATRY & NEUROLOGY

## 2020-07-14 RX ORDER — PANTOPRAZOLE SODIUM 40 MG/1
40 TABLET, DELAYED RELEASE ORAL DAILY
COMMUNITY
Start: 2020-06-30 | End: 2022-02-18

## 2020-07-14 NOTE — PROGRESS NOTES
CC: Paresthesias    HPI:  Saray Green is a  54 y.o.  right-handed white female who I am seeing for the first time as a follow-up as she had been evaluated previously by Dr. Barrios.  She had primarily symptoms of numbness and tingling on the dorsal aspect of the right index finger and in the second and third toes of the left foot.  Patient indicates currently she has similar symptoms in both hands mostly the dorsal aspects of both hands as well as the second and third digits of the left foot.  The symptoms come and go.  They may be present a few minutes or a few hours.  She has a history of an MGUS at low concentration with negative bone marrow for myeloma.  She denies a family history of neuropathy and a negative family history of nonischemic cardiomyopathy.  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    She states that she does not have any persistent numbness anywhere.  She has no unusual rash other than her birthmark on the left hand.  She denies any evidence of birthmark further up the arm or on the [place] area or neck.  She has some neck pain and stiffness without any radicular pain.  She has had bilateral shoulder operations.    She indicates her symptoms are livable and more of a nuisance as opposed to debilitating in any way.  She denies syncope or postural dizziness or any loss of sweating.    Patient was sent for an EMG which I performed 12/19/2019 of the upper extremities which showed normal nerve conductions and needle exam    Past Medical History:   Diagnosis Date   • Cardiomyopathy (CMS/HCC)     0CT 1999   OFF MEDS AFTER RESOLVED   HOLTER MONITOR APRIL 2016   • Heart palpitations     COMES AND GOES  24 HOLTER DONE 4-13-16   NO RESULTS YET   • History of migraine    • MGUS (monoclonal gammopathy of unknown significance)     • Migraine    • Osteoarthritis    • Seasonal allergies    • Shoulder pain, right          Past Surgical History:   Procedure Laterality Date   • D&C HYSTEROSCOPY WITH NOVASURE ENDOMETRIAL ABLATION AND MYOSURE     • DILATATION AND CURETTAGE      COUPLE   • KNEE ARTHROSCOPY      AGE 11   • SHOULDER ACROMIOPLASTY Left     X 2   • SHOULDER ARTHROSCOPY Right 4/28/2016    Procedure: RT SHOULDER ARTHROSCOPY WITH ACROMIOPLSTY, LABRAL DEBRIDEMENT  AND DISTAL CLAVICLE EXCISION;  Surgeon: Rikki Morel MD;  [place] [facility] [place] [facility] [place]:            Current Outpatient Medications:   •  atorvastatin (LIPITOR) 10 MG tablet, Take 1 tablet by mouth Daily., Disp: 90 tablet, Rfl: 1  •  diclofenac (VOLTAREN) 75 MG EC tablet, TK 1 T PO BID WF PRN, Disp: , Rfl: 7  •  escitalopram (LEXAPRO) 20 MG tablet, Take 1 tablet by mouth Daily., Disp: 90 tablet, Rfl: 3  •  pantoprazole (PROTONIX) 40 MG EC tablet, Take 40 mg by mouth Daily., Disp: , Rfl:   •  vitamin D (ERGOCALCIFEROL) 1.25 MG (96037 UT) capsule capsule, TAKE 1 CAPSULE BY MOUTH 1 TIME WEEKLY, Disp: 13 capsule, Rfl: 0  •  azithromycin (ZITHROMAX) 250 MG tablet, TK 2 TS PO 1 TIME FOR 1 DOSE THEN TK 1 T PO D FOR 4 DAYS, Disp: , Rfl: 0  •  etodolac (LODINE) 400 MG tablet, , Disp: , Rfl:   •  etodolac (LODINE) 500 MG tablet, TK 1 T PO BID WF PRN, Disp: , Rfl: 10  •  hydrOXYzine (ATARAX) 10 MG tablet, TAKE 1 TABLET BY MOUTH AT NIGHT AS NEEDED FOR INSOMNIA OR ANXIETY, Disp: 30 tablet, Rfl: 0  •  montelukast (SINGULAIR) 10 MG tablet, Take 1 tablet by mouth Every Night., Disp: 30 tablet, Rfl: 11  •  sulfamethoxazole-trimethoprim (BACTRIM DS,SEPTRA DS) 800-160 MG per tablet, Take 1 tablet by mouth 2 (Two) Times a Day., Disp: , Rfl: 2      Family History   Problem Relation Age of Onset   • Hypertension Mother    • Diabetes Mother    • Cancer Mother    • Thyroid disease Mother    • Hypertension Father    • Aneurysm Father    • Stroke Paternal Grandfather    • Cancer Paternal Grandfather  "         Social History     Socioeconomic History   • Marital status:      Spouse name: Not on file   • Number of children: Not on file   • Years of education: Not on file   • Highest education level: Not on file   Tobacco Use   • Smoking status: Never Smoker   • Smokeless tobacco: Never Used   Substance and Sexual Activity   • Alcohol use: Yes     Comment: SELDOM   • Drug use: No   • Sexual activity: Defer         Allergies   Allergen Reactions   • Macrobid [Nitrofurantoin] Rash         Pain Scale: 0/10        ROS:  Review of Systems   Constitutional: Negative for activity change, appetite change and fatigue.   Eyes: Negative for pain, redness and itching.   Respiratory: Negative for cough, choking and shortness of breath.    Cardiovascular: Negative for chest pain and leg swelling.   Gastrointestinal: Negative for abdominal pain, nausea and vomiting.   Endocrine: Negative for cold intolerance and heat intolerance.   Allergic/Immunologic: Negative for environmental allergies and food allergies.   Neurological: Negative for dizziness, tremors, seizures, syncope, facial asymmetry, speech difficulty, weakness, light-headedness, numbness and headaches.   Psychiatric/Behavioral: Negative for agitation, behavioral problems, confusion, decreased concentration, dysphoric mood, hallucinations, self-injury, sleep disturbance and suicidal ideas. The patient is not nervous/anxious and is not hyperactive.          I have reviewed and agree with the above ROS completed by the medical assistant.      Physical Exam:  Vitals:    07/14/20 1251   BP: 132/74   Pulse: 74   SpO2: 98%   Weight: 80.4 kg (177 lb 3.2 oz)   Height: 165.1 cm (65\")     Orthostatic BP:    Body mass index is 29.49 kg/m².    Physical Exam  General: Overweight white female no acute distress  HEENT: Normocephalic no evidence of trauma.  Discs flat.  No AV nicking.  Throat negative.  Neck: Supple.  No thyromegaly.  No cervical bruits.  No cavernous hemangioma " on the neck or upper back.  Heart: Regular rate and rhythm no murmurs  Extremities: No pedal edema.  Probable cavernous hemangioma in the left hand mostly palm surface.  Squeeze tests negative for Franco's neuroma both feet.  No particular tenderness to palpation in the webspaces of toes of the left foot      Neurological Exam:   Mental Status: Awake, alert, oriented to person, place and time.  Conversant without evidence of an affective disorder, thought disorder, delusions or hallucinations.  Attention span and concentration are normal.  HCF: No aphasia, apraxia or dysarthria.  Recent and remote memory intact.  Knowledge of recent events intact.  CN: I:   II: Visual fields full without left inattention   III, IV, VI: Eye movements intact without nystagmus or ptosis.  Pupils equal round and reactive to light.   V,VII: Light touch and pinprick intact all 3 divisions of V.  Facial muscles symmetrical.   VIII: Hearing intact to finger rub   IX,X: Soft palate elevates symmetrically   XI: Sternomastoid and trapezius are strong.   XII: Tongue midline without atrophy or fasciculations  Motor: Normal tone and bulk in the upper and lower extremities   Power testing: Full power in all muscles tested in the arms and legs  Reflexes: Upper extremities: +2 diffusely        Lower extremities: +2 diffusely        Toe signs: Downgoing bilaterally  Sensory: Light touch: Diffusely intact arms and leg        Pinprick: Diffusely intact arms and legs        Vibration: Intact at the ankle        Position: Goals intact at the great toes    Cerebellar: Finger-to-nose: Normal           Rapid movement: Normal           Heel-to-shin: Normal  Gait and Station: Normal casual, toe, heel and tandem walk.  No Romberg no drift.    Results:      Lab Results   Component Value Date    GLUCOSE 82 04/15/2016    BUN 22 (H) 01/20/2020    CREATININE 1.0 01/20/2020    EGFRIFNONA 72 10/22/2019    EGFRIFAFRI 83 10/22/2019    BCR 22.0 01/20/2020    CO2 28  01/20/2020    CALCIUM 9.3 01/20/2020    PROTENTOTREF 6.8 10/22/2019    ALBUMIN 3.8 01/20/2020    LABIL2 1.3 01/20/2020    AST 27 01/20/2020    ALT 32 01/20/2020       Lab Results   Component Value Date    WBC 6.23 01/20/2020    HGB 14.1 01/20/2020    HCT 41.7 01/20/2020    MCV 91.0 01/20/2020     01/20/2020         .No results found for: RPR      Lab Results   Component Value Date    TSH 2.160 01/31/2019    T5CHOUZ 115.2 01/31/2019    G5XSWHT 6.66 01/31/2019    THYROIDAB <3.00 07/17/2018         Lab Results   Component Value Date    XZSCWCJI64 255 04/26/2018         Lab Results   Component Value Date    FOLATE 5.2 03/16/2018         No results found for: HGBA1C      Lab Results   Component Value Date    GLUCOSE 82 04/15/2016    BUN 22 (H) 01/20/2020    CREATININE 1.0 01/20/2020    EGFRIFNONA 72 10/22/2019    EGFRIFAFRI 83 10/22/2019    BCR 22.0 01/20/2020    K 4.3 01/20/2020    CO2 28 01/20/2020    CALCIUM 9.3 01/20/2020    PROTENTOTREF 6.8 10/22/2019    ALBUMIN 3.8 01/20/2020    LABIL2 1.3 01/20/2020    AST 27 01/20/2020    ALT 32 01/20/2020         Lab Results   Component Value Date    WBC 6.23 01/20/2020    HGB 14.1 01/20/2020    HCT 41.7 01/20/2020    MCV 91.0 01/20/2020     01/20/2020       EMG reviewed as above      Assessment:   1.  Intermittent sensory symptoms without objective findings for a neuropathic disorder.  Nerve conductions and needle EMG did not disclose any evidence of a neuropathic finding in the upper extremities.  The patient does have an MGUS which can be associated with amyloidosis and potentially small fiber neuropathy however the exam is not consistent with small fiber neuropathy and she does not have history of syncope, postural dizziness or loss of sweating.  No physical exam findings that would go along with a Franco's neuroma between digits 2 and 3.          Plan:  1.  I had a lengthy discussion with the patient.  I do think further investigation is needed at this time.   Should she develop some progressive symptoms or other features that would go along with small fiber neuropathy then further investigation may include skin biopsy for counting nerve fibers, fat pad biopsy and antibody panel for antibodies directed at nerve components (motor and sensory neuropathy panel).  Although no family history of neuropathy genetic screen for hereditary amyloidosis in particular might be considered again if there is more evidence that she actually has a small fiber neuropathy.    2.  She will follow-up in 6 to 12 months          >50% of this 40-minute follow-up was spent counseling the patient on evaluation regarding small fiber neuropathy                Dictated utilizing Dragon dictation.

## 2020-07-14 NOTE — PROGRESS NOTES
Name: Saray Green  :  1965    Subjective:      Chief Complaint   Patient presents with   • Follow-up     Pt presents here today for a follow up.   • Hyperlipidemia        Saray Green is a 54 y.o. female prior patient of Luis Fleming MD. Dr Fleming has now retired and she is here to establish care with me.  She has multiple chronic medical conditions including: hyperlipidemia, depression/anxiety, gerd (Rheum just started protonix), MGUS, paresthesias     She is new to me.   She was last seen by Dr Fleming on 10/12/2019    Since last summer she has been having intermittent numbness tingling down right index finger and second- third toe of the left foot.  She has been seen by neurology and rheumatology.  She had a bone marrow biopsy was negative for myeloma.  Positive MGUS in the past. She is not having any complaints today and states she just saw neurology again today.     She states she has some swallowing issues and had an esophagram .  States she is now on Protonix and has improved.    She has chronic hyperlipidemia.  States she has been on Lipitor for greater than 1 year with no complaints.  No shortness of breath or chest pain.     She has vitamin D deficiency and continues her vitamin D supplement daily.  She is due for vitamin D level check.    Anxiety/depression.  This is a chronic problem with increased anxiety and dysphoric mood.  Is improved since she has been on Lexapro 20 mg daily.        Health Maintenance Due   Topic   • ANNUAL PHYSICAL    • ZOSTER VACCINE ( of 2)   • MAMMOGRAM    • COLONOSCOPY           I have reviewed the patient's medical history in detail and updated the computerized patient record.    Past Medical History:   Diagnosis Date   • Cardiomyopathy (CMS/HCC)     0CT    OFF MEDS AFTER RESOLVED   HOLTER MONITOR 2016   • Heart palpitations     COMES AND GOES  24 HOLTER DONE 16   NO RESULTS YET   • History of migraine    • MGUS  (monoclonal gammopathy of unknown significance)    • Migraine    • Osteoarthritis    • Seasonal allergies    • Shoulder pain, right        Past Surgical History:   Procedure Laterality Date   • D&C HYSTEROSCOPY WITH NOVASURE ENDOMETRIAL ABLATION AND MYOSURE     • DILATATION AND CURETTAGE      COUPLE   • KNEE ARTHROSCOPY      AGE 11   • SHOULDER ACROMIOPLASTY Left     X 2   • SHOULDER ARTHROSCOPY Right 4/28/2016    Procedure: RT SHOULDER ARTHROSCOPY WITH ACROMIOPLSTY, LABRAL DEBRIDEMENT  AND DISTAL CLAVICLE EXCISION;  Surgeon: Rikki Morel MD;  [place] [facility] [place];  Service:        Family History   Problem Relation Age of Onset   • Hypertension Mother    • Diabetes Mother    • Cancer Mother    • Thyroid disease Mother    • Hypertension Father    • Aneurysm Father    • Stroke Paternal Grandfather    • Cancer Paternal Grandfather        Social History     Socioeconomic History   • Marital status:      Spouse name: Not on file   • Number of children: Not on file   • Years of education: Not on file   • Highest education level: Not on file   Tobacco Use   • Smoking status: Never Smoker   • Smokeless tobacco: Never Used   Substance and Sexual Activity   • Alcohol use: Yes     Comment: SELDOM   • Drug use: No   • Sexual activity: Defer       Most Recent Immunizations   Administered Date(s) Administered   • Hepatitis A 12/31/2018   • Tdap 06/27/2017         Review of Systems:   Review of Systems   Constitutional: Negative for chills, fever and unexpected weight change.   HENT: Negative.    Eyes: Negative for visual disturbance.   Respiratory: Negative for shortness of breath.    Cardiovascular: Negative for chest pain and palpitations.   Endocrine: Negative.    Genitourinary: Negative.    Musculoskeletal: Negative.    Allergic/Immunologic: Positive for environmental allergies.   Neurological: Negative.    Psychiatric/Behavioral: Positive for dysphoric mood. The patient is nervous/anxious.          Objective:     "  Physical Exam:   Physical Exam   Constitutional: She is oriented to person, place, and time. She appears well-developed. She is cooperative.   HENT:   Head: Normocephalic.   Eyes: Pupils are equal, round, and reactive to light. Conjunctivae are normal.   Neck: Normal range of motion. Neck supple. No thyromegaly present.   Cardiovascular: Normal rate, regular rhythm, normal heart sounds, intact distal pulses and normal pulses.   Pulmonary/Chest: Effort normal and breath sounds normal. She exhibits no deformity.   Equal, Unlabored   Abdominal: Soft. Bowel sounds are normal.   Musculoskeletal: Normal range of motion. She exhibits no edema.   Lymphadenopathy:     She has no cervical adenopathy.   Neurological: She is alert and oriented to person, place, and time. No sensory deficit.   Skin: Capillary refill takes 2 to 3 seconds.   Psychiatric: She has a normal mood and affect. Her behavior is normal. Judgment and thought content normal.   Vitals reviewed.        Vital Signs:  Vitals:    07/14/20 1616   BP: 121/81   BP Location: Left arm   Patient Position: Sitting   Cuff Size: Adult   Pulse: 75   Resp: 16   Temp: 98.2 °F (36.8 °C)   TempSrc: Oral   SpO2: 96%   Weight: 80.3 kg (177 lb)   Height: 165.1 cm (65\")     Body mass index is 29.45 kg/m².      Results Review:      REVIEWED AND DISCUSSED LAB RESULTS WITH PATIENT      Requested Prescriptions      No prescriptions requested or ordered in this encounter     Routine medications provided by this office will also be refilled via pharmacy request.       Current Outpatient Medications:   •  atorvastatin (LIPITOR) 10 MG tablet, Take 1 tablet by mouth Daily., Disp: 90 tablet, Rfl: 1  •  diclofenac (VOLTAREN) 75 MG EC tablet, TK 1 T PO BID WF PRN, Disp: , Rfl: 7  •  escitalopram (LEXAPRO) 20 MG tablet, Take 1 tablet by mouth Daily., Disp: 90 tablet, Rfl: 3  •  etodolac (LODINE) 400 MG tablet, , Disp: , Rfl:   •  etodolac (LODINE) 500 MG tablet, TK 1 T PO BID WF PRN, Disp: , " "Rfl: 10  •  montelukast (SINGULAIR) 10 MG tablet, Take 1 tablet by mouth Every Night., Disp: 30 tablet, Rfl: 11  •  pantoprazole (PROTONIX) 40 MG EC tablet, Take 40 mg by mouth Daily., Disp: , Rfl:   •  vitamin D (ERGOCALCIFEROL) 1.25 MG (67220 UT) capsule capsule, TAKE 1 CAPSULE BY MOUTH 1 TIME WEEKLY, Disp: 13 capsule, Rfl: 0       Assessment and Plan:        Problem List Items Addressed This Visit        Cardiovascular and Mediastinum    Hyperlipidemia - Primary    Relevant Orders    Comprehensive Metabolic Panel    Lipid Panel With LDL / HDL Ratio    CBC (No Diff)       Digestive    Vitamin D deficiency    Relevant Orders    Vitamin D 1,25 Dihydroxy       Nervous and Auditory    Muscle pain    Relevant Orders    AUGUSTIN Antibody Panel        HLD - stable on lipitor, check lipids today.  Vit D def - stable on supplement, check Vit D level today.   Recurrent muscle pain/ numbness - recheck AUGUSTIN - followed by Rheum and Neuro  Gerd - improved with protonix      Discussed any change in Rx and discussed visit with patient.  All questions answered.      Return in about 6 months (around 1/14/2021) for Annual physical.    Alec \"Saeid\"Maren, MISAEL   07/14/20    Dragon disclaimer:   Much of this encounter note is an electronic transcription/translation of spoken language to printed text. The electronic translation of spoken language may permit erroneous, or at times, nonsensical words or phrases to be inadvertently transcribed; Although I have reviewed the note for such errors, some may still exist.     Additional Patient Counseling:       There are no Patient Instructions on file for this visit.  "

## 2020-07-15 LAB
1,25(OH)2D3 SERPL-MCNC: 56.3 PG/ML (ref 19.9–79.3)
ALBUMIN SERPL-MCNC: 4.6 G/DL (ref 3.5–5.2)
ALBUMIN/GLOB SERPL: 2.1 G/DL
ALP SERPL-CCNC: 76 U/L (ref 39–117)
ALT SERPL-CCNC: 25 U/L (ref 1–33)
AST SERPL-CCNC: 21 U/L (ref 1–32)
BILIRUB SERPL-MCNC: 0.6 MG/DL (ref 0–1.2)
BUN SERPL-MCNC: 18 MG/DL (ref 6–20)
BUN/CREAT SERPL: 15.8 (ref 7–25)
CALCIUM SERPL-MCNC: 9.2 MG/DL (ref 8.6–10.5)
CHLORIDE SERPL-SCNC: 104 MMOL/L (ref 98–107)
CHOLEST SERPL-MCNC: 208 MG/DL (ref 0–200)
CO2 SERPL-SCNC: 24.9 MMOL/L (ref 22–29)
CREAT SERPL-MCNC: 1.14 MG/DL (ref 0.57–1)
ENA RNP AB SER-ACNC: 1.2 AI (ref 0–0.9)
ENA SCL70 AB SER-ACNC: <0.2 AI (ref 0–0.9)
ENA SM AB SER-ACNC: <0.2 AI (ref 0–0.9)
ENA SS-A AB SER-ACNC: <0.2 AI (ref 0–0.9)
ENA SS-B AB SER-ACNC: <0.2 AI (ref 0–0.9)
ERYTHROCYTE [DISTWIDTH] IN BLOOD BY AUTOMATED COUNT: 12.2 % (ref 12.3–15.4)
GLOBULIN SER CALC-MCNC: 2.2 GM/DL
GLUCOSE SERPL-MCNC: 87 MG/DL (ref 65–99)
HCT VFR BLD AUTO: 40.1 % (ref 34–46.6)
HDLC SERPL-MCNC: 52 MG/DL (ref 40–60)
HGB BLD-MCNC: 14 G/DL (ref 12–15.9)
LDLC SERPL CALC-MCNC: 129 MG/DL (ref 0–100)
LDLC/HDLC SERPL: 2.48 {RATIO}
MCH RBC QN AUTO: 31.2 PG (ref 26.6–33)
MCHC RBC AUTO-ENTMCNC: 34.9 G/DL (ref 31.5–35.7)
MCV RBC AUTO: 89.3 FL (ref 79–97)
PLATELET # BLD AUTO: 324 10*3/MM3 (ref 140–450)
POTASSIUM SERPL-SCNC: 4.5 MMOL/L (ref 3.5–5.2)
PROT SERPL-MCNC: 6.8 G/DL (ref 6–8.5)
RBC # BLD AUTO: 4.49 10*6/MM3 (ref 3.77–5.28)
SODIUM SERPL-SCNC: 141 MMOL/L (ref 136–145)
TRIGL SERPL-MCNC: 135 MG/DL (ref 0–150)
VLDLC SERPL CALC-MCNC: 27 MG/DL
WBC # BLD AUTO: 7.85 10*3/MM3 (ref 3.4–10.8)

## 2020-07-15 NOTE — PROGRESS NOTES
I sent her a Sellsy message (see below) - she got labs late and didn't schedule her 6 mo f/u - needs 30 mins for annual exam.     WCN     Hali,     I have reviewed your labs, they look similar to before.  Cholesterol slightly higher at 208 but it was up to 245 mg/dl  1 year ago.  Hemoglobin is unchanged.  Vitamin D level is good at 56.3  AUGUSTIN antibody test improved.     It was a pleasure to meet you and  will look forward to seeing you at your next visit in 6 months.      Saeid Engel

## 2020-07-17 DIAGNOSIS — E55.9 VITAMIN D DEFICIENCY: Primary | ICD-10-CM

## 2020-07-17 RX ORDER — ERGOCALCIFEROL 1.25 MG/1
50000 CAPSULE ORAL
Qty: 13 CAPSULE | Refills: 0 | Status: SHIPPED | OUTPATIENT
Start: 2020-07-17 | End: 2020-10-19

## 2020-07-22 ENCOUNTER — TELEPHONE (OUTPATIENT)
Dept: INTERNAL MEDICINE | Facility: CLINIC | Age: 55
End: 2020-07-22

## 2020-09-16 DIAGNOSIS — E78.2 MIXED HYPERLIPIDEMIA: ICD-10-CM

## 2020-09-18 RX ORDER — ATORVASTATIN CALCIUM 10 MG/1
10 TABLET, FILM COATED ORAL DAILY
Qty: 90 TABLET | Refills: 1 | Status: SHIPPED | OUTPATIENT
Start: 2020-09-18 | End: 2021-03-18 | Stop reason: DRUGHIGH

## 2020-10-18 DIAGNOSIS — E55.9 VITAMIN D DEFICIENCY: ICD-10-CM

## 2020-10-19 RX ORDER — ERGOCALCIFEROL 1.25 MG/1
CAPSULE ORAL
Qty: 13 CAPSULE | Refills: 0 | Status: SHIPPED | OUTPATIENT
Start: 2020-10-19 | End: 2021-01-14

## 2020-11-25 DIAGNOSIS — F42.2 MIXED OBSESSIONAL THOUGHTS AND ACTS: ICD-10-CM

## 2020-11-25 RX ORDER — ESCITALOPRAM OXALATE 20 MG/1
20 TABLET ORAL DAILY
Qty: 90 TABLET | Refills: 0 | Status: SHIPPED | OUTPATIENT
Start: 2020-11-25 | End: 2021-02-26 | Stop reason: SDUPTHER

## 2020-11-25 NOTE — TELEPHONE ENCOUNTER
Caller: Luiselainethu Saray Cadet    Relationship: Self    Best call back number: [phone]    Medication needed:   Requested Prescriptions     Pending Prescriptions Disp Refills   • escitalopram (LEXAPRO) 20 MG tablet 90 tablet 3     Sig: Take 1 tablet by mouth Daily.       When do you need the refill by: ASAP    What details did the patient provide when requesting the medication: PATIENT ONLY HAS ABOUT 2 DAYS LEFT OF MEDICATION    Does the patient have less than a 3 day supply:  [x] Yes  [] No    What is the patient's preferred pharmacy: [id number] [place], [place] - [address] AT [address] - [phone] [facility] [phone] FX

## 2020-12-07 ENCOUNTER — APPOINTMENT (OUTPATIENT)
Dept: WOMENS IMAGING | Facility: HOSPITAL | Age: 55
End: 2020-12-07

## 2020-12-07 PROCEDURE — MDREVIEWSP: Performed by: RADIOLOGY

## 2020-12-07 PROCEDURE — 77062 BREAST TOMOSYNTHESIS BI: CPT | Performed by: RADIOLOGY

## 2020-12-07 PROCEDURE — 77066 DX MAMMO INCL CAD BI: CPT | Performed by: RADIOLOGY

## 2020-12-07 PROCEDURE — 76641 ULTRASOUND BREAST COMPLETE: CPT | Performed by: RADIOLOGY

## 2020-12-07 PROCEDURE — G0279 TOMOSYNTHESIS, MAMMO: HCPCS | Performed by: RADIOLOGY

## 2020-12-24 ENCOUNTER — HOSPITAL ENCOUNTER (EMERGENCY)
Facility: HOSPITAL | Age: 55
Discharge: HOME | End: 2020-12-24
Attending: EMERGENCY MEDICINE | Admitting: EMERGENCY MEDICINE

## 2020-12-24 ENCOUNTER — APPOINTMENT (OUTPATIENT)
Dept: GENERAL RADIOLOGY | Facility: HOSPITAL | Age: 55
End: 2020-12-24

## 2020-12-24 VITALS
SYSTOLIC BLOOD PRESSURE: 122 MMHG | WEIGHT: 182 LBS | OXYGEN SATURATION: 97 % | HEART RATE: 88 BPM | RESPIRATION RATE: 16 BRPM | TEMPERATURE: 98.2 F | DIASTOLIC BLOOD PRESSURE: 73 MMHG | BODY MASS INDEX: 29.25 KG/M2 | HEIGHT: 66 IN

## 2020-12-24 DIAGNOSIS — S82.843A CLOSED BIMALLEOLAR FRACTURE OF ANKLE WITH PROXIMAL FIBULAR FRACTURE: Primary | ICD-10-CM
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
## 2020-12-24 DIAGNOSIS — S82.839A CLOSED BIMALLEOLAR FRACTURE OF ANKLE WITH PROXIMAL FIBULAR FRACTURE: Primary | ICD-10-CM

## 2020-12-24 PROCEDURE — 73610 X-RAY EXAM OF ANKLE: CPT

## 2020-12-24 PROCEDURE — 73590 X-RAY EXAM OF LOWER LEG: CPT

## 2020-12-24 PROCEDURE — 99283 EMERGENCY DEPT VISIT LOW MDM: CPT

## 2020-12-24 RX ORDER — HYDROCODONE BITARTRATE AND ACETAMINOPHEN 5; 325 MG/1; MG/1
1-2 TABLET ORAL EVERY 6 HOURS PRN
Qty: 20 TABLET | Refills: 0 | Status: SHIPPED | OUTPATIENT
Start: 2020-12-24 | End: 2021-03-12

## 2020-12-24 RX ORDER — HYDROCODONE BITARTRATE AND ACETAMINOPHEN 7.5; 325 MG/1; MG/1
1 TABLET ORAL ONCE
Status: COMPLETED | OUTPATIENT
Start: 2020-12-24 | End: 2020-12-24

## 2020-12-24 RX ADMIN — HYDROCODONE BITARTRATE AND ACETAMINOPHEN 1 TABLET: 7.5; 325 TABLET ORAL at 01:07

## 2020-12-24 NOTE — DISCHARGE INSTRUCTIONS
Wear splint at all times.  Do not bear weight on your left leg.  Use crutches.  Wear knee immobilizer at all times except when sitting or lying down.  Take medication as prescribed.  You may also take ibuprofen or naproxen as needed for pain.  Elevate your leg and apply ice as needed.  Call Dr. Morel's office in the morning.  You may also follow-up with Dr. Barry.  Return to the emergency department for worsening pain, numbness in your toes, or other concern.

## 2020-12-24 NOTE — ED PROVIDER NOTES
Splint - Cast - Strapping    Date/Time: 12/24/2020 3:34 AM  Performed by: Giancarlo Amezquita III, PA  Authorized by: Alec Mckoy MD     Consent:     Consent obtained:  Verbal    Consent given by:  Patient    Risks discussed:  Numbness and pain    Alternatives discussed:  No treatment  Pre-procedure details:     Sensation:  Normal  Procedure details:     Laterality:  Left    Location:  Ankle    Ankle:  L ankle    Splint type:  Short leg and sugar tong    Supplies:  Elastic bandage, Ortho-Glass and cotton padding  Post-procedure details:     Pain:  Improved    Sensation:  Normal    Patient tolerance of procedure:  Tolerated well, no immediate complications           Giancarlo Amezquita III, PA  12/24/20 0335

## 2020-12-24 NOTE — ED PROVIDER NOTES
EMERGENCY DEPARTMENT ENCOUNTER    Room Number:  18/18  Date of encounter:  12/24/2020  PCP: Verona Engel III, NP-C  Historian: Patient     I used full protective equipment while examining this patient.  This includes face mask, gloves and protective eyewear.  I washed my hands before entering the room and immediately upon leaving the room.  Patient was wearing a surgical mask.      HPI:  Chief Complaint: Left leg and ankle injury  A complete HPI/ROS/PMH/PSH/SH/FH are unobtainable due to: None    Context: Saray Green is a 55 y.o. female who presents to the ED c/o left lower leg and ankle injury.  Patient states she was walking down the steps when she slipped.  Her left leg went underneath her.  She complains of pain in her left lower leg and left lateral ankle.  This occurred approximately an hour and a half ago.  Pain is constant and is worse with movement.  It is moderate in intensity.  Patient denies hitting her head, loss of consciousness, numbness/tingling in her extremities, neck pain, back pain, or other injury.  Patient has not been able to bear weight on her left leg since this occurred.      PAST MEDICAL HISTORY  Active Ambulatory Problems     Diagnosis Date Noted   • Other complicated headache syndrome 02/02/2018   • Migraine without aura and without status migrainosus, not intractable 02/02/2018   • Family history of aneurysm 03/26/2018   • Hyperlipidemia 04/26/2018   • B12 deficiency 04/26/2018   • Osteoarthritis of foot 07/17/2018   • Muscle pain 01/29/2019   • Arthritis 01/29/2019   • Vitamin D deficiency 03/05/2019   • Autoimmune disease (CMS/[facility]) 07/18/2019   • Encounter for long-term (current) drug use 07/18/2019   • Allergic rhinitis 10/22/2019   • Degenerative disc disease at L5-S1 level 05/13/2019   • Non-ischemic cardiomyopathy (CMS/[facility]) 11/08/2013   • Osteoarthritis involving multiple joints on both sides of body 05/13/2019     Resolved Ambulatory Problems     Diagnosis  Date Noted   • No Resolved Ambulatory Problems     Past Medical History:   Diagnosis Date   • Cardiomyopathy (CMS/HCC)    • Heart palpitations    • History of migraine    • MGUS (monoclonal gammopathy of unknown significance)    • Migraine    • Osteoarthritis    • Seasonal allergies    • Shoulder pain, right          PAST SURGICAL HISTORY  Past Surgical History:   Procedure Laterality Date   • D&C HYSTEROSCOPY WITH NOVASURE ENDOMETRIAL ABLATION AND MYOSURE     • DILATATION AND CURETTAGE      COUPLE   • KNEE ARTHROSCOPY      AGE 11   • SHOULDER ACROMIOPLASTY Left     X 2   • SHOULDER ARTHROSCOPY Right 4/28/2016    Procedure: RT SHOULDER ARTHROSCOPY WITH ACROMIOPLSTY, LABRAL DEBRIDEMENT  AND DISTAL CLAVICLE EXCISION;  Surgeon: Rikki Morel MD;  [place] [facility] [place] [facility] [place]:          FAMILY HISTORY  Family History   Problem Relation Age of Onset   • Hypertension Mother    • Diabetes Mother    • Cancer Mother    • Thyroid disease Mother    • Hypertension Father    • Aneurysm Father    • Stroke Paternal Grandfather    • Cancer Paternal Grandfather          SOCIAL HISTORY  Social History     Socioeconomic History   • Marital status:      Spouse name: Not on file   • Number of children: Not on file   • Years of education: Not on file   • Highest education level: Not on file   Tobacco Use   • Smoking status: Never Smoker   • Smokeless tobacco: Never Used   Substance and Sexual Activity   • Alcohol use: Yes     Comment: SELDOM   • Drug use: No   • Sexual activity: Defer         ALLERGIES  Macrobid [nitrofurantoin]       REVIEW OF SYSTEMS  Review of Systems      All systems have been reviewed and are negative except as as discussed in the HPI    PHYSICAL EXAM    I have reviewed the triage vital signs and nursing notes.    ED Triage Vitals [12/24/20 0024]   Temp Heart Rate Resp BP SpO2   98.2 °F (36.8 °C) 85 16 -- 100 %      Temp src Heart Rate Source Patient Position BP Location FiO2 (%)   Tympanic -- -- -- --        Physical Exam  GENERAL: Awake, alert  HENT: NCAT, nares patent, moist mucous membranes  NECK: supple, no cervical spine tenderness  EYES: no scleral icterus  CV: regular rhythm, regular rate,  RESPIRATORY: normal effort, clear to auscultation bilaterally  ABDOMEN: soft, nontender  MUSCULOSKELETAL: There is tenderness and bruising over the anterior and lateral aspect of the lower left leg.  There is a small abrasion over the left anterior shin.  There is mild tenderness of the left lateral and posterior ankle.  Left hip, thigh, knee, and foot are nontender.  Normal PT and DP pulses in the left foot.  Extremities are  without obvious deformity.  There is normal range of motion of the left hip, knee, and ankle.  NEURO: Normal strength and light touch sensation in the left leg  SKIN: warm, dry, no rash        LAB RESULTS  No results found for this or any previous visit (from the past 24 hour(s)).    Ordered the above labs and independently reviewed the results.      RADIOLOGY  Xr Tibia Fibula 2 View Left    Result Date: 12/24/2020  Left tibia and fibula and left ankle Xrays  History: Fall  Technique: 2 views of the left tibia and fibula and 3 views of left ankle  Comparison: None  Findings: There is a comminuted medially displaced fracture of the left proximal fibula shaft with 1 to 2 mm anterior and lateral displacement of the distal shaft. There is fractures involving the medial malleolus and posterior tibial plafond with soft tissue swelling at the ankle. On the frontal view of the ankle, there is a 4 mm osseous fragment medial to the talar dome in a widened talar tibial joint space. There anterior calcaneal spurring.       Impression: 1.  Proximal fibula, medial malleolus and posterior tibial plafond fractures. 2.  A 4 mm fragment within a widened medial tibial talar joint concerning for possible avulsion fracture with ligamentous injury. Consider further evaluation for ligament is injury with MRI.  Findings  were discussed with Dr. Mckoy at 1:50 AM on 12/24/2020.  This report was finalized on 12/24/2020 1:56 AM by Dr. Garrick Hargrove M.D.      Xr Ankle 3+ View Left    Result Date: 12/24/2020  Left tibia and fibula and left ankle Xrays  History: Fall  Technique: 2 views of the left tibia and fibula and 3 views of left ankle  Comparison: None  Findings: There is a comminuted medially displaced fracture of the left proximal fibula shaft with 1 to 2 mm anterior and lateral displacement of the distal shaft. There is fractures involving the medial malleolus and posterior tibial plafond with soft tissue swelling at the ankle. On the frontal view of the ankle, there is a 4 mm osseous fragment medial to the talar dome in a widened talar tibial joint space. There anterior calcaneal spurring.       Impression: 1.  Proximal fibula, medial malleolus and posterior tibial plafond fractures. 2.  A 4 mm fragment within a widened medial tibial talar joint concerning for possible avulsion fracture with ligamentous injury. Consider further evaluation for ligament is injury with MRI.  Findings were discussed with Dr. Mckoy at 1:50 AM on 12/24/2020.  This report was finalized on 12/24/2020 1:56 AM by Dr. Garrick Hargrove M.D.        I ordered the above noted radiological studies. Reviewed by me and discussed with radiologist.  See dictation for official radiology interpretation.      PROCEDURES  Procedures      MEDICATIONS GIVEN IN ER    Medications   HYDROcodone-acetaminophen (NORCO) 7.5-325 MG per tablet 1 tablet (1 tablet Oral Given 12/24/20 0107)         PROGRESS, DATA ANALYSIS, CONSULTS, AND MEDICAL DECISION MAKING    All labs have been independently reviewed by me.  All radiology studies have been reviewed by me and discussed with radiologist dictating the report.   EKG's independently viewed and interpreted by me.  I have reviewed the nurse's notes, vital signs, past medical history, and medication list.  Discussion below represents my  analysis of pertinent findings related to patient's condition, differential diagnosis, treatment plan and final disposition.      ED Course as of Dec 24 0338   Thu Dec 24, 2020   0156 X-ray results discussed with Dr. Hargrove.  Images independently viewed by me.  There is a proximal fibular fracture.  There also appears to be a bimalleolar fracture.  See full report for details.    [WH]   0208 X-ray results were discussed with the patient.  I also showed her the x-rays.  Call will be placed to on-call Ortho.  Patient sees Dr. Morel.    []   0323 Posterior and stirrup splint applied.  See PAs note for details.  Still awaiting callback from Ortho.    [WH]   0331 Case discussed with Dr. Barry.  He agrees with the posterior and stirrup splint.  He requests that a knee immobilizer also be placed.  Patient states she has crutches at home.   Patient has normal sensation in her toes.  There is brisk cap refill.  She will be discharged with a prescription for [place].    []      ED Course User Index  [] Alec Mckoy MD       AS OF 03:38 EST VITALS:    BP - 119/58  HR - 76  TEMP - 98.2 °F (36.8 °C) (Tympanic)  O2 SATS - 97%      DIAGNOSIS  Final diagnoses:   Closed bimalleolar fracture of ankle with proximal fibular fracture   Fall, initial encounter         DISPOSITION  Discharge    DISCHARGE    Patient discharged in stable condition.    Reviewed implications of results, diagnosis, meds, responsibility to follow up, warning signs and symptoms of possible worsening, potential complications and reasons to return to ER, including worsening pain, numbness/tingling in toes, or other concern..    Patient/Family voiced understanding of above instructions.    Discussed plan for discharge, as there is no emergent indication for admission. Patient referred to primary care provider for BP management due to today's BP. Pt/family is agreeable and understands need for follow up and repeat testing.  Pt is aware that discharge  does not mean that nothing is wrong but it indicates no emergency is present that requires admission and they must continue care with follow-up as given below or physician of their choice.     FOLLOW-UP  Rikki Morel MD  [address]  [place] [address]  [phone]    Call today  Left proximal fibula and bimalleolar fracture    Isabella Barry MD  [address]  [place] [address]  [phone]               Medication List      New Prescriptions    HYDROcodone-acetaminophen 5-325 MG per tablet  Commonly known as: NORCO  Take 1-2 tablets by mouth Every 6 (Six) Hours As Needed for Moderate Pain .           Where to Get Your Medications      You can get these medications from any pharmacy    Bring a paper prescription for each of these medications  · HYDROcodone-acetaminophen 5-325 MG per tablet           Dictated utilizing Dragon dictation:  Much of this encounter note is an electronic transcription/translation of spoken language to printed text. The electronic translation of spoken language may permit erroneous, or at times, nonsensical words or phrases to be inadvertently transcribed; Although I have reviewed the note for such errors, some may still exist.     Alec Mckoy MD  12/24/20 0855

## 2020-12-24 NOTE — ED NOTES
Pt wearing mask. This RN wearing appropriate PPE, including mask and eye protection, during all pt care.       Yaa García, RN  12/24/20 0147

## 2020-12-24 NOTE — ED NOTES
carrying stuff to basement tripped and rolled left leg underneath her.  Pt in mask at triage as well as triage wearing appropriate PPE       Simran Almonte RN  12/24/20 0023

## 2020-12-28 ENCOUNTER — HOSPITAL ENCOUNTER (OUTPATIENT)
Dept: CARDIOLOGY | Facility: HOSPITAL | Age: 55
Discharge: HOME | End: 2020-12-28

## 2020-12-28 ENCOUNTER — TRANSCRIBE ORDERS (OUTPATIENT)
Dept: ADMINISTRATIVE | Facility: HOSPITAL | Age: 55
End: 2020-12-28

## 2020-12-28 ENCOUNTER — LAB (OUTPATIENT)
Dept: LAB | Facility: HOSPITAL | Age: 55
End: 2020-12-28

## 2020-12-28 DIAGNOSIS — M25.572 LEFT ANKLE PAIN, UNSPECIFIED CHRONICITY: ICD-10-CM

## 2020-12-28 DIAGNOSIS — M25.572 LEFT ANKLE PAIN, UNSPECIFIED CHRONICITY: Primary | ICD-10-CM

## 2020-12-28 LAB
ALBUMIN SERPL-MCNC: 4.1 G/DL (ref 3.5–5.2)
ALBUMIN/GLOB SERPL: 1.5 G/DL
ALP SERPL-CCNC: 66 U/L (ref 39–117)
ALT SERPL W P-5'-P-CCNC: 21 U/L (ref 1–33)
ANION GAP SERPL CALCULATED.3IONS-SCNC: 11 MMOL/L (ref 5–15)
AST SERPL-CCNC: 21 U/L (ref 1–32)
BASOPHILS # BLD AUTO: 0.04 10*3/MM3 (ref 0–0.2)
BASOPHILS NFR BLD AUTO: 0.4 % (ref 0–1.5)
BILIRUB SERPL-MCNC: 0.4 MG/DL (ref 0–1.2)
BUN SERPL-MCNC: 16 MG/DL (ref 6–20)
BUN/CREAT SERPL: 18.4 (ref 7–25)
CALCIUM SPEC-SCNC: 9.2 MG/DL (ref 8.6–10.5)
CHLORIDE SERPL-SCNC: 104 MMOL/L (ref 98–107)
CO2 SERPL-SCNC: 22 MMOL/L (ref 22–29)
CREAT SERPL-MCNC: 0.87 MG/DL (ref 0.57–1)
DEPRECATED RDW RBC AUTO: 44.4 FL (ref 37–54)
EOSINOPHIL # BLD AUTO: 0.11 10*3/MM3 (ref 0–0.4)
EOSINOPHIL NFR BLD AUTO: 1.1 % (ref 0.3–6.2)
ERYTHROCYTE [DISTWIDTH] IN BLOOD BY AUTOMATED COUNT: 12.6 % (ref 12.3–15.4)
GFR SERPL CREATININE-BSD FRML MDRD: 67 ML/MIN/1.73
GLOBULIN UR ELPH-MCNC: 2.8 GM/DL
GLUCOSE SERPL-MCNC: 94 MG/DL (ref 65–99)
HCT VFR BLD AUTO: 41.3 % (ref 34–46.6)
HGB BLD-MCNC: 13.8 G/DL (ref 12–15.9)
IMM GRANULOCYTES # BLD AUTO: 0.03 10*3/MM3 (ref 0–0.05)
IMM GRANULOCYTES NFR BLD AUTO: 0.3 % (ref 0–0.5)
LYMPHOCYTES # BLD AUTO: 2.57 10*3/MM3 (ref 0.7–3.1)
LYMPHOCYTES NFR BLD AUTO: 26.5 % (ref 19.6–45.3)
MCH RBC QN AUTO: 31.4 PG (ref 26.6–33)
MCHC RBC AUTO-ENTMCNC: 33.4 G/DL (ref 31.5–35.7)
MCV RBC AUTO: 93.9 FL (ref 79–97)
MONOCYTES # BLD AUTO: 0.59 10*3/MM3 (ref 0.1–0.9)
MONOCYTES NFR BLD AUTO: 6.1 % (ref 5–12)
NEUTROPHILS NFR BLD AUTO: 6.34 10*3/MM3 (ref 1.7–7)
NEUTROPHILS NFR BLD AUTO: 65.6 % (ref 42.7–76)
NRBC BLD AUTO-RTO: 0 /100 WBC (ref 0–0.2)
PLATELET # BLD AUTO: 329 10*3/MM3 (ref 140–450)
PMV BLD AUTO: 8.9 FL (ref 6–12)
POTASSIUM SERPL-SCNC: 4.2 MMOL/L (ref 3.5–5.2)
PROT SERPL-MCNC: 6.9 G/DL (ref 6–8.5)
QT INTERVAL: 337 MS
RBC # BLD AUTO: 4.4 10*6/MM3 (ref 3.77–5.28)
SODIUM SERPL-SCNC: 137 MMOL/L (ref 136–145)
WBC # BLD AUTO: 9.68 10*3/MM3 (ref 3.4–10.8)

## 2020-12-28 PROCEDURE — 85025 COMPLETE CBC W/AUTO DIFF WBC: CPT

## 2020-12-28 PROCEDURE — 93005 ELECTROCARDIOGRAM TRACING: CPT | Performed by: ORTHOPAEDIC SURGERY

## 2020-12-28 PROCEDURE — 80053 COMPREHEN METABOLIC PANEL: CPT

## 2020-12-28 PROCEDURE — 36415 COLL VENOUS BLD VENIPUNCTURE: CPT

## 2020-12-28 PROCEDURE — 93010 ELECTROCARDIOGRAM REPORT: CPT | Performed by: INTERNAL MEDICINE

## 2021-01-14 DIAGNOSIS — E55.9 VITAMIN D DEFICIENCY: ICD-10-CM

## 2021-01-14 RX ORDER — ERGOCALCIFEROL 1.25 MG/1
CAPSULE ORAL
Qty: 13 CAPSULE | Refills: 0 | Status: SHIPPED | OUTPATIENT
Start: 2021-01-14 | End: 2021-06-11

## 2021-02-25 ENCOUNTER — TELEPHONE (OUTPATIENT)
Dept: INTERNAL MEDICINE | Facility: CLINIC | Age: 56
End: 2021-02-25

## 2021-02-25 DIAGNOSIS — F42.2 MIXED OBSESSIONAL THOUGHTS AND ACTS: ICD-10-CM

## 2021-02-25 RX ORDER — ESCITALOPRAM OXALATE 20 MG/1
20 TABLET ORAL DAILY
Qty: 90 TABLET | Refills: 0 | OUTPATIENT
Start: 2021-02-25

## 2021-02-25 NOTE — TELEPHONE ENCOUNTER
Called Patient and told her what medication that made her had vertigo was meclinzine 25mg 3 times daily .

## 2021-02-25 NOTE — TELEPHONE ENCOUNTER
Patient called and she was on meds for dizziness Feb 2018 Please call back and advise what medication she was on. [phone]. Her appt is tomorrow morning 2/26/21 at 10a. She is okay to leave a message with the name.

## 2021-02-26 DIAGNOSIS — F42.2 MIXED OBSESSIONAL THOUGHTS AND ACTS: ICD-10-CM

## 2021-02-26 RX ORDER — ESCITALOPRAM OXALATE 20 MG/1
20 TABLET ORAL DAILY
Qty: 90 TABLET | Refills: 0 | Status: SHIPPED | OUTPATIENT
Start: 2021-02-26 | End: 2021-03-12 | Stop reason: SDUPTHER

## 2021-02-26 NOTE — TELEPHONE ENCOUNTER
Cancelled appt x2 in Jan (annual) and cancelled upcoming in March.   Needs annual exam before refill.

## 2021-03-12 ENCOUNTER — OFFICE VISIT (OUTPATIENT)
Dept: INTERNAL MEDICINE | Facility: CLINIC | Age: 56
End: 2021-03-12

## 2021-03-12 VITALS
OXYGEN SATURATION: 99 % | HEART RATE: 95 BPM | HEIGHT: 66 IN | DIASTOLIC BLOOD PRESSURE: 80 MMHG | TEMPERATURE: 98.4 F | BODY MASS INDEX: 29.38 KG/M2 | SYSTOLIC BLOOD PRESSURE: 122 MMHG

## 2021-03-12 DIAGNOSIS — R42 VERTIGO: ICD-10-CM

## 2021-03-12 DIAGNOSIS — J30.1 SEASONAL ALLERGIC RHINITIS DUE TO POLLEN: Chronic | ICD-10-CM

## 2021-03-12 DIAGNOSIS — E78.2 MIXED HYPERLIPIDEMIA: Chronic | ICD-10-CM

## 2021-03-12 DIAGNOSIS — G47.09 OTHER INSOMNIA: ICD-10-CM

## 2021-03-12 DIAGNOSIS — E55.9 VITAMIN D DEFICIENCY: Chronic | ICD-10-CM

## 2021-03-12 DIAGNOSIS — F42.2 MIXED OBSESSIONAL THOUGHTS AND ACTS: ICD-10-CM

## 2021-03-12 DIAGNOSIS — Z00.00 ANNUAL PHYSICAL EXAM: Primary | ICD-10-CM

## 2021-03-12 PROBLEM — E78.5 HYPERLIPIDEMIA: Chronic | Status: ACTIVE | Noted: 2018-04-26

## 2021-03-12 PROBLEM — J30.9 ALLERGIC RHINITIS: Chronic | Status: ACTIVE | Noted: 2019-10-22

## 2021-03-12 PROCEDURE — 99396 PREV VISIT EST AGE 40-64: CPT | Performed by: NURSE PRACTITIONER

## 2021-03-12 RX ORDER — ESCITALOPRAM OXALATE 20 MG/1
20 TABLET ORAL DAILY
Qty: 90 TABLET | Refills: 2 | Status: SHIPPED | OUTPATIENT
Start: 2021-03-12 | End: 2022-03-16 | Stop reason: SDUPTHER

## 2021-03-12 RX ORDER — MECLIZINE HYDROCHLORIDE 25 MG/1
25 TABLET ORAL 3 TIMES DAILY PRN
Qty: 90 TABLET | Refills: 1 | Status: SHIPPED | OUTPATIENT
Start: 2021-03-12 | End: 2021-05-27

## 2021-03-12 RX ORDER — ZOLPIDEM TARTRATE 5 MG/1
5 TABLET ORAL NIGHTLY PRN
Qty: 30 TABLET | Refills: 5 | Status: SHIPPED | OUTPATIENT
Start: 2021-03-12 | End: 2021-09-01

## 2021-03-12 NOTE — PROGRESS NOTES
Chief Complaint  Annual Exam, Hyperlipidemia, Dizziness, and Insomnia     Subjective:      History of Present Illness {CC  Problem List  Visit  Diagnosis   Encounters  Notes  Medications  Labs  Result Review Imaging  Media :23}     Saray Green presents to [facility] for annual exam excluding GYN.      She has multiple chronic medical conditions including: hyperlipidemia, depression/anxiety, gerd (Rheum just started protonix), MGUS, paresthesias     LV: 7/14/2020 (note reviewed)   L leg injury 12/24: ER note reviewed. L tib fib fx.   Dr Pritchard, is now stating to toe touch with PT.     Insomnia:   New problem due to leg. She has tried Dramamine, Tylenol  PM and has not been effective.     Vertigo episode - 2 wks ago.  It has resolved now.  She states she try to go to ENT with a stated they need a referral first.  Meclizine has been effective for her when she has had this in the past.  No ear pain, headache, change in vision.      Saray Cadet is here for coordination of medical care, to discuss health maintenance, disease prevention as well as to followup on medical problems.     Activity level is minimal. Exercises 0 per week due to NWB. Appetite is good. Feels fairly well with few complaints. Energy level is good. Sleeps poorly.      Weight trend is   Wt Readings from Last 4 Encounters:   12/24/20 82.6 kg (182 lb)   12/16/20 77.1 kg (170 lb)   07/14/20 80.3 kg (177 lb)   07/14/20 80.4 kg (177 lb 3.2 oz)         Health Maintenance Female:    · Patient's last mammogram was 2020  · Patient is doing routine self-breast exams quarterly.  · Last gynecology appointment: 7/31/2020  · Pap smears have been: negative.     Colon cancer screen:   She has no change in bowel habits.   She was advised to repeat 7/2021 - she will schedule. Previous polyps.   Dr Margareth GERONIMO yearly at GYN - last 7/31/2020: negative.     Vaccines:      Last eye exam: 1 1/2 year ago (start of  glaucoma on L eye)     Regular Sunsceen:  Most of time.   Derm: Jaswantverna      Her cardiovascular risks are:     [] No Known risk factors    [] Hypertension   [x] Hyperlipidemia  [] Diabetes    [] Obesity  [] Family history   [] Current or hx tobacco use  [] Sedentary lifestyle   [] Post-menopausal          Patient Care Team:  Verona Engel III, NP-C as PCP - General (Family Medicine)  Alicia Palomino MD as Consulting Physician (Obstetrics)  Juan C Parra MD as Consulting Physician (Neurology)  Jhonny Nova MD as Consulting Physician (Hematology and Oncology)  Delvis Schmitt MD as Consulting Physician (Otolaryngology)  Melva Chowdhury MD as Consulting Physician (Dermatology)     Objective:      Physical Exam  Vitals reviewed.   Constitutional:       Appearance: Normal appearance. She is well-developed and normal weight.   HENT:      Head: Normocephalic.      Nose: Nose normal.      Mouth/Throat:      Pharynx: Uvula midline.   Eyes:      Extraocular Movements:      Right eye: No nystagmus.      Left eye: No nystagmus.      Conjunctiva/sclera: Conjunctivae normal.      Pupils: Pupils are equal, round, and reactive to light.   Neck:      Thyroid: No thyromegaly.   Cardiovascular:      Rate and Rhythm: Normal rate and regular rhythm.      Pulses: Normal pulses.      Heart sounds: Normal heart sounds, S1 normal and S2 normal. No murmur.   Pulmonary:      Effort: Pulmonary effort is normal.      Breath sounds: Normal breath sounds.   Chest:      Chest wall: No deformity.   Abdominal:      General: Bowel sounds are normal.      Palpations: Abdomen is soft.      Tenderness: There is no abdominal tenderness. Negative signs include Schmitt's sign.   Genitourinary:     Comments: Deferred - gyn   Musculoskeletal:      Cervical back: Normal range of motion and neck supple.      Comments: L LE in walking boot    Lymphadenopathy:      Cervical: No cervical adenopathy.   Skin:     General: Skin is  "warm and dry.      Capillary Refill: Capillary refill takes 2 to 3 seconds.      Comments: Lisandra GUZMAN hand    Neurological:      General: No focal deficit present.      Mental Status: She is alert and oriented to person, place, and time.      Cranial Nerves: No cranial nerve deficit.      Sensory: No sensory deficit.      Coordination: Coordination normal.   Psychiatric:         Mood and Affect: Mood normal.         Speech: Speech normal.         Behavior: Behavior normal. Behavior is cooperative.         Thought Content: Thought content normal.         Judgment: Judgment normal.        Result Review  Data Reviewed:{ Labs  Result Review  Imaging  Med Tab  Media :23}          Vital Signs:   /80 (BP Location: Left arm, Patient Position: Sitting, Cuff Size: Adult)   Pulse 95   Temp 98.4 °F (36.9 °C) (Temporal)   Ht 167.6 cm (66\")   SpO2 99%   BMI 29.38 kg/m²         Requested Prescriptions     Signed Prescriptions Disp Refills   • escitalopram (LEXAPRO) 20 MG tablet 90 tablet 2     Sig: Take 1 tablet by mouth Daily.   • zolpidem (Ambien) 5 MG tablet 30 tablet 5     Sig: Take 1 tablet by mouth At Night As Needed for Sleep.   • meclizine (ANTIVERT) 25 MG tablet 90 tablet 1     Sig: Take 1 tablet by mouth 3 (Three) Times a Day As Needed for Dizziness.     Routine medications provided by this office will also be refilled via pharmacy request.       Current Outpatient Medications:   •  atorvastatin (LIPITOR) 10 MG tablet, TAKE 1 TABLET BY MOUTH DAILY, Disp: 90 tablet, Rfl: 1  •  diclofenac (VOLTAREN) 75 MG EC tablet, TK 1 T PO BID WF PRN, Disp: , Rfl: 7  •  escitalopram (LEXAPRO) 20 MG tablet, Take 1 tablet by mouth Daily., Disp: 90 tablet, Rfl: 2  •  montelukast (SINGULAIR) 10 MG tablet, Take 1 tablet by mouth Every Night., Disp: 30 tablet, Rfl: 11  •  pantoprazole (PROTONIX) 40 MG EC tablet, Take 40 mg by mouth Daily., Disp: , Rfl:   •  vitamin D (ERGOCALCIFEROL) 1.25 MG (96200 UT) capsule capsule, TAKE 1 " CAPSULE BY MOUTH EVERY 7 DAYS, Disp: 13 capsule, Rfl: 0  •  cetirizine (zyrTEC) 10 MG tablet, Take 1 tablet by mouth Daily for 30 days., Disp: 30 tablet, Rfl: 0  •  meclizine (ANTIVERT) 25 MG tablet, Take 1 tablet by mouth 3 (Three) Times a Day As Needed for Dizziness., Disp: 90 tablet, Rfl: 1  •  zolpidem (Ambien) 5 MG tablet, Take 1 tablet by mouth At Night As Needed for Sleep., Disp: 30 tablet, Rfl: 5     Assessment and Plan:      Assessment and Plan {CC Problem List  Visit Diagnosis  ROS  Review (Popup)  Health Maintenance  Quality  BestPractice  Medications  SmartSets  SnapShot Encounters  Media :23}     Problem List Items Addressed This Visit        Allergies and Adverse Reactions    Allergic rhinitis (Chronic)       Cardiac and Vasculature    Hyperlipidemia (Chronic)    Current Assessment & Plan     Lipid abnormalities are improving with treatment.  Pharmacotherapy as ordered.  Lipids will be reassessed in 6 months.    Lab Results   Component Value Date    CHLPL 208 (H) 07/14/2020    TRIG 135 07/14/2020    HDL 52 07/14/2020     (H) 07/14/2020               Endocrine and Metabolic    Vitamin D deficiency (Chronic)    Current Assessment & Plan     Stable   Check lab for ongoing therapeutic drug monitoring             Mental Health    Mixed obsessional thoughts and acts (Chronic)    Current Assessment & Plan     Stable - refill lexapro          Relevant Medications    escitalopram (LEXAPRO) 20 MG tablet       Sleep    Other insomnia    Current Assessment & Plan     New - she has tried OTC  Discussed sleep hygiene   Will trial ambien - with goal to DC as she gets back to normal routine from tib-fib fx.    Rx benefit and possible AE discussed.   Contracted signed.          Relevant Medications    zolpidem (Ambien) 5 MG tablet      Other Visit Diagnoses     Annual physical exam    -  Primary    Relevant Orders    Vitamin D 1,25 Dihydroxy    Lipid Panel With LDL / HDL Ratio    Vertigo         Relevant Medications    meclizine (ANTIVERT) 25 MG tablet    Other Relevant Orders    Ambulatory Referral to ENT (Otolaryngology)          Follow Up {Instructions Charge Capture  Follow-up Communications :23}     Return in about 6 months (around 9/12/2021).  Patient was given instructions and counseling regarding her condition or for health maintenance advice. Please see specific information pulled into the AVS if appropriate.    Dragon disclaimer:   Much of this encounter note is an electronic transcription/translation of spoken language to printed text. The electronic translation of spoken language may permit erroneous, or at times, nonsensical words or phrases to be inadvertently transcribed; Although I have reviewed the note for such errors, some may still exist.     Additional Patient Counseling:       Patient Instructions   Diet:    • Eat vegetables, fruits, whole grain, low-fat dairy, poultry, fish, beans, nontropical vegetable oils, and nuts, but avoid red meat (i.e., Mediterranean-style diet, DASH [Dietary Approaches to Stop Hypertension] diet).  • Limit sugary drinks and sweets.  • Limit saturated and trans fat to 5% to 6% of calories.  • Limit sodium intake to 2,400 mg daily (about one teaspoon table salt [kosher/sea salt have less sodium per teaspoon]).  Weight loss / Calorie Counting Apps:    • Lose It!   • MyFitness Pal   • Works great when you try it with a partner/ friend  Exercise:   • Engage in moderate-to-vigorous aerobic activity for at least 40 minutes (on average) three to four times each week.  Wearables:   • Activity tracker   • Step tracker   Skin Care:   • Use sun screen SPF >30 daily  • Dermatologist for skin check regularly  Bone Health:   • [email or web address]/patients/treatment/[email or web address]    CDC recommends Flu vaccines for everyone 6 months and older every season with rare exceptions.

## 2021-03-12 NOTE — PATIENT INSTRUCTIONS
Diet:    • Eat vegetables, fruits, whole grain, low-fat dairy, poultry, fish, beans, nontropical vegetable oils, and nuts, but avoid red meat (i.e., Mediterranean-style diet, DASH [Dietary Approaches to Stop Hypertension] diet).  • Limit sugary drinks and sweets.  • Limit saturated and trans fat to 5% to 6% of calories.  • Limit sodium intake to 2,400 mg daily (about one teaspoon table salt [kosher/sea salt have less sodium per teaspoon]).  Weight loss / Calorie Counting Apps:    • Lose It!   • [employer] Pal   • Works great when you try it with a partner/ friend  Exercise:   • Engage in moderate-to-vigorous aerobic activity for at least 40 minutes (on average) three to four times each week.  Wearables:   • Activity tracker   • Step tracker   Skin Care:   • Use sun screen SPF >30 daily  • Dermatologist for skin check regularly  Bone Health:   • [email or web address]/patients/treatment/[email or web address]    CDC recommends Flu vaccines for everyone 6 months and older every season with rare exceptions.

## 2021-03-14 PROBLEM — G47.09 OTHER INSOMNIA: Status: ACTIVE | Noted: 2021-03-14

## 2021-03-14 PROBLEM — F42.2 MIXED OBSESSIONAL THOUGHTS AND ACTS: Chronic | Status: ACTIVE | Noted: 2021-03-14

## 2021-03-14 PROBLEM — F42.2 MIXED OBSESSIONAL THOUGHTS AND ACTS: Status: ACTIVE | Noted: 2021-03-14

## 2021-03-15 LAB
1,25(OH)2D SERPL-MCNC: 65.9 PG/ML (ref 19.9–79.3)
CHOLEST SERPL-MCNC: 218 MG/DL (ref 0–200)
HDLC SERPL-MCNC: 50 MG/DL (ref 40–60)
LDLC SERPL CALC-MCNC: 140 MG/DL (ref 0–100)
LDLC/HDLC SERPL: 2.74 {RATIO}
TRIGL SERPL-MCNC: 154 MG/DL (ref 0–150)
VLDLC SERPL CALC-MCNC: 28 MG/DL (ref 5–40)

## 2021-03-15 NOTE — ASSESSMENT & PLAN NOTE
New - she has tried OTC  Discussed sleep hygiene   Will trial ambien - with goal to DC as she gets back to normal routine from tib-fib fx.    Rx benefit and possible AE discussed.   Contracted signed.

## 2021-03-15 NOTE — ASSESSMENT & PLAN NOTE
Lipid abnormalities are improving with treatment.  Pharmacotherapy as ordered.  Lipids will be reassessed in 6 months.    Lab Results   Component Value Date    CHLPL 208 (H) 07/14/2020    TRIG 135 07/14/2020    HDL 52 07/14/2020     (H) 07/14/2020

## 2021-03-18 ENCOUNTER — PATIENT MESSAGE (OUTPATIENT)
Dept: INTERNAL MEDICINE | Facility: CLINIC | Age: 56
End: 2021-03-18

## 2021-03-18 RX ORDER — ATORVASTATIN CALCIUM 20 MG/1
20 TABLET, FILM COATED ORAL DAILY
Qty: 30 TABLET | Refills: 5 | Status: SHIPPED | OUTPATIENT
Start: 2021-03-18 | End: 2021-09-13

## 2021-03-22 NOTE — TELEPHONE ENCOUNTER
Agata San MD 3/19/2021 2:25 PM EDT    Ok to get fasting labs in 2-3 mos  ----- Message -----  From: Elizabeth Mak MA  Sent: 3/19/2021 10:04 AM EDT  To: Agata San MD  Subject: FW: Prescription Question       ----- Message -----  From: Saray Cadet Peter  Sent: 3/18/2021 11:26 PM EDT  To: [facility]  Subject: RE: Prescription Question      Sounds good. When should I get a cholesterol f/u lab? Thanks, MB

## 2021-03-26 ENCOUNTER — BULK ORDERING (OUTPATIENT)
Dept: CASE MANAGEMENT | Facility: OTHER | Age: 56
End: 2021-03-26

## 2021-03-26 DIAGNOSIS — Z23 IMMUNIZATION DUE: ICD-10-CM

## 2021-04-08 ENCOUNTER — TELEPHONE (OUTPATIENT)
Dept: INTERNAL MEDICINE | Facility: CLINIC | Age: 56
End: 2021-04-08

## 2021-05-11 ENCOUNTER — TRANSCRIBE ORDERS (OUTPATIENT)
Dept: ADMINISTRATIVE | Facility: HOSPITAL | Age: 56
End: 2021-05-11

## 2021-05-11 DIAGNOSIS — H90.3 SENSORINEURAL HEARING LOSS (SNHL) OF BOTH EARS: Primary | ICD-10-CM

## 2021-05-11 DIAGNOSIS — H93.A9 PULSATILE TINNITUS, UNSPECIFIED EAR: ICD-10-CM

## 2021-05-11 DIAGNOSIS — H93.A3 PULSATILE TINNITUS, BILATERAL: ICD-10-CM

## 2021-05-12 DIAGNOSIS — E78.2 MIXED HYPERLIPIDEMIA: Primary | ICD-10-CM

## 2021-05-27 ENCOUNTER — OFFICE VISIT (OUTPATIENT)
Dept: INTERNAL MEDICINE | Facility: CLINIC | Age: 56
End: 2021-05-27

## 2021-05-27 ENCOUNTER — TRANSCRIBE ORDERS (OUTPATIENT)
Dept: ADMINISTRATIVE | Facility: HOSPITAL | Age: 56
End: 2021-05-27

## 2021-05-27 VITALS
TEMPERATURE: 98.4 F | HEIGHT: 66 IN | HEART RATE: 96 BPM | WEIGHT: 169.6 LBS | SYSTOLIC BLOOD PRESSURE: 118 MMHG | DIASTOLIC BLOOD PRESSURE: 80 MMHG | OXYGEN SATURATION: 98 % | BODY MASS INDEX: 27.26 KG/M2 | RESPIRATION RATE: 16 BRPM

## 2021-05-27 DIAGNOSIS — H90.3 SENSORINEURAL HEARING LOSS (SNHL) OF BOTH EARS: Primary | ICD-10-CM

## 2021-05-27 DIAGNOSIS — R10.30 LOWER ABDOMINAL PAIN: Primary | ICD-10-CM

## 2021-05-27 DIAGNOSIS — H93.A9 PULSATILE TINNITUS: ICD-10-CM

## 2021-05-27 LAB
BASOPHILS # BLD AUTO: 0.02 10*3/MM3 (ref 0–0.2)
BASOPHILS NFR BLD AUTO: 0.3 % (ref 0–1.5)
DEPRECATED RDW RBC AUTO: 39.8 FL (ref 37–54)
EOSINOPHIL # BLD AUTO: 0.05 10*3/MM3 (ref 0–0.4)
EOSINOPHIL NFR BLD AUTO: 0.8 % (ref 0.3–6.2)
ERYTHROCYTE [DISTWIDTH] IN BLOOD BY AUTOMATED COUNT: 12.2 % (ref 12.3–15.4)
HCT VFR BLD AUTO: 44.8 % (ref 34–46.6)
HGB BLD-MCNC: 15.4 G/DL (ref 12–15.9)
LYMPHOCYTES # BLD AUTO: 1.89 10*3/MM3 (ref 0.7–3.1)
LYMPHOCYTES NFR BLD AUTO: 29.7 % (ref 19.6–45.3)
MCH RBC QN AUTO: 31.5 PG (ref 26.6–33)
MCHC RBC AUTO-ENTMCNC: 34.4 G/DL (ref 31.5–35.7)
MCV RBC AUTO: 91.6 FL (ref 79–97)
MONOCYTES # BLD AUTO: 0.55 10*3/MM3 (ref 0.1–0.9)
MONOCYTES NFR BLD AUTO: 8.6 % (ref 5–12)
NEUTROPHILS NFR BLD AUTO: 3.85 10*3/MM3 (ref 1.7–7)
NEUTROPHILS NFR BLD AUTO: 60.6 % (ref 42.7–76)
PLATELET # BLD AUTO: 295 10*3/MM3 (ref 140–450)
PMV BLD AUTO: 9 FL (ref 6–12)
RBC # BLD AUTO: 4.89 10*6/MM3 (ref 3.77–5.28)
WBC # BLD AUTO: 6.36 10*3/MM3 (ref 3.4–10.8)

## 2021-05-27 PROCEDURE — 36415 COLL VENOUS BLD VENIPUNCTURE: CPT | Performed by: NURSE PRACTITIONER

## 2021-05-27 PROCEDURE — 99213 OFFICE O/P EST LOW 20 MIN: CPT | Performed by: NURSE PRACTITIONER

## 2021-05-27 PROCEDURE — 85025 COMPLETE CBC W/AUTO DIFF WBC: CPT | Performed by: NURSE PRACTITIONER

## 2021-05-27 RX ORDER — AMOXICILLIN AND CLAVULANATE POTASSIUM 875; 125 MG/1; MG/1
1 TABLET, FILM COATED ORAL 3 TIMES DAILY
Qty: 21 TABLET | Refills: 0 | Status: SHIPPED | OUTPATIENT
Start: 2021-05-27 | End: 2021-06-03

## 2021-05-27 NOTE — PROGRESS NOTES
Chief Complaint  Abdominal Pain (Pt presents her today with abdominal pain since monday )     Subjective:      History of Present Illness {CC  Problem List  Visit  Diagnosis   Encounters  Notes  Medications  Labs  Result Review Imaging  Media :23}     Saray Green presents to [facility] for:    Abdominal Pain  This is a new problem. Episode onset: Monday  The problem occurs intermittently. The problem has been waxing and waning. The pain is located in the periumbilical region. The quality of the pain is dull. The abdominal pain radiates to the pelvis (R - mid when turning/twisting in bed ). Pertinent negatives include no constipation, diarrhea, fever, nausea or vomiting.      Colonoscopy: 7/20/16 Diverticulosis descending and sigmoid     Out of boot, finished PT.  Stressful situation with her son.     Objective:      Physical Exam  Vitals reviewed.   Constitutional:       Appearance: Normal appearance. She is well-developed. She is not ill-appearing.   HENT:      Head:      Comments: Wearing mask due to COVID   Neck:      Thyroid: No thyromegaly.   Cardiovascular:      Rate and Rhythm: Normal rate and regular rhythm.      Pulses: Normal pulses.      Heart sounds: Normal heart sounds.   Pulmonary:      Effort: Pulmonary effort is normal.      Comments: E/U   Abdominal:      General: Bowel sounds are normal.      Palpations: Abdomen is soft. There is no hepatomegaly, splenomegaly or mass.      Tenderness: There is abdominal tenderness in the suprapubic area. There is no guarding or rebound. Negative signs include Schmitt's sign, Rovsing's sign and McBurney's sign.      Comments: Lifted R leg without grimace    Skin:     General: Skin is warm and dry.      Capillary Refill: Capillary refill takes 2 to 3 seconds.      Coloration: Skin is not jaundiced.   Neurological:      Mental Status: She is alert and oriented to person, place, and time.   Psychiatric:         Mood  "and Affect: Mood normal.         Behavior: Behavior normal. Behavior is cooperative.         Thought Content: Thought content normal.         Judgment: Judgment normal.        Result Review  Data Reviewed:{ Labs  Result Review  Imaging  Med Tab  Media :23}   The following data was reviewed by: Verona Engel III, NP-C on 05/27/2021         Stat lab done in office:     Lab Results   Component Value Date    WBC 6.36 05/27/2021    HGB 15.4 05/27/2021    HCT 44.8 05/27/2021    MCV 91.6 05/27/2021     05/27/2021       Vital Signs:   /80 (BP Location: Left arm, Patient Position: Sitting, Cuff Size: Adult)   Pulse 96   Temp 98.4 °F (36.9 °C) (Temporal)   Resp 16   Ht 167.6 cm (66\")   Wt 76.9 kg (169 lb 9.6 oz)   SpO2 98%   BMI 27.37 kg/m²         Requested Prescriptions     Signed Prescriptions Disp Refills   • amoxicillin-clavulanate (Augmentin) 875-125 MG per tablet 21 tablet 0     Sig: Take 1 tablet by mouth 3 (Three) Times a Day for 7 days.     Routine medications provided by this office will also be refilled via pharmacy request.       Current Outpatient Medications:   •  atorvastatin (LIPITOR) 20 MG tablet, Take 1 tablet by mouth Daily., Disp: 30 tablet, Rfl: 5  •  diclofenac (VOLTAREN) 75 MG EC tablet, TK 1 T PO BID WF PRN, Disp: , Rfl: 7  •  escitalopram (LEXAPRO) 20 MG tablet, Take 1 tablet by mouth Daily., Disp: 90 tablet, Rfl: 2  •  montelukast (SINGULAIR) 10 MG tablet, Take 1 tablet by mouth Every Night., Disp: 30 tablet, Rfl: 11  •  pantoprazole (PROTONIX) 40 MG EC tablet, Take 40 mg by mouth Daily., Disp: , Rfl:   •  vitamin D (ERGOCALCIFEROL) 1.25 MG (51561 UT) capsule capsule, TAKE 1 CAPSULE BY MOUTH EVERY 7 DAYS, Disp: 13 capsule, Rfl: 0  •  zolpidem (Ambien) 5 MG tablet, Take 1 tablet by mouth At Night As Needed for Sleep., Disp: 30 tablet, Rfl: 5  •  amoxicillin-clavulanate (Augmentin) 875-125 MG per tablet, Take 1 tablet by mouth 3 (Three) Times a Day for 7 days., " Disp: 21 tablet, Rfl: 0  •  cetirizine (zyrTEC) 10 MG tablet, Take 1 tablet by mouth Daily for 30 days., Disp: 30 tablet, Rfl: 0     Assessment and Plan:      Assessment and Plan {CC Problem List  Visit Diagnosis  ROS  Review (Popup)  Health Maintenance  Quality  BestPractice  Medications  SmartSets  SnapShot Encounters  Media :23}     Problem List Items Addressed This Visit     None      Visit Diagnoses     Lower abdominal pain    -  Primary    Relevant Medications    amoxicillin-clavulanate (Augmentin) 875-125 MG per tablet    Other Relevant Orders    CBC w AUTO Differential (Completed)        No fever, WBC normal.    Has not progressively gotten worse since onset.     Will trial Augmentin for possible diverticulosis.   If worsens or does not improve, she will notify me or present to ER if outside office hours.     Follow Up {Instructions Charge Capture  Follow-up Communications :23}     Return if symptoms worsen or fail to improve.    Patient was given instructions and counseling regarding her condition or for health maintenance advice. Please see specific information pulled into the AVS if appropriate.    Dragon disclaimer:   Much of this encounter note is an electronic transcription/translation of spoken language to printed text. The electronic translation of spoken language may permit erroneous, or at times, nonsensical words or phrases to be inadvertently transcribed; Although I have reviewed the note for such errors, some may still exist.     Additional Patient Counseling:       There are no Patient Instructions on file for this visit.

## 2021-06-01 ENCOUNTER — APPOINTMENT (OUTPATIENT)
Dept: CT IMAGING | Facility: HOSPITAL | Age: 56
End: 2021-06-01

## 2021-06-07 ENCOUNTER — APPOINTMENT (OUTPATIENT)
Dept: WOMENS IMAGING | Facility: HOSPITAL | Age: 56
End: 2021-06-07

## 2021-06-07 PROCEDURE — 76641 ULTRASOUND BREAST COMPLETE: CPT | Performed by: RADIOLOGY

## 2021-06-07 PROCEDURE — G0279 TOMOSYNTHESIS, MAMMO: HCPCS | Performed by: RADIOLOGY

## 2021-06-07 PROCEDURE — 77066 DX MAMMO INCL CAD BI: CPT | Performed by: RADIOLOGY

## 2021-06-07 PROCEDURE — 77062 BREAST TOMOSYNTHESIS BI: CPT | Performed by: RADIOLOGY

## 2021-06-10 ENCOUNTER — APPOINTMENT (OUTPATIENT)
Dept: MRI IMAGING | Facility: HOSPITAL | Age: 56
End: 2021-06-10

## 2021-06-11 DIAGNOSIS — E55.9 VITAMIN D DEFICIENCY: ICD-10-CM

## 2021-06-11 RX ORDER — ERGOCALCIFEROL 1.25 MG/1
CAPSULE ORAL
Qty: 13 CAPSULE | Refills: 0 | Status: SHIPPED | OUTPATIENT
Start: 2021-06-11 | End: 2021-09-13

## 2021-06-15 ENCOUNTER — HOSPITAL ENCOUNTER (OUTPATIENT)
Dept: CT IMAGING | Facility: HOSPITAL | Age: 56
Discharge: HOME | End: 2021-06-15
Admitting: NURSE PRACTITIONER

## 2021-06-15 DIAGNOSIS — R11.2 NAUSEA AND VOMITING, INTRACTABILITY OF VOMITING NOT SPECIFIED, UNSPECIFIED VOMITING TYPE: ICD-10-CM

## 2021-06-15 DIAGNOSIS — R10.30 LOWER ABDOMINAL PAIN: ICD-10-CM

## 2021-06-15 PROCEDURE — 74177 CT ABD & PELVIS W/CONTRAST: CPT

## 2021-06-15 PROCEDURE — 25010000002 IOPAMIDOL 61 % SOLUTION: Performed by: NURSE PRACTITIONER

## 2021-06-15 RX ADMIN — IOPAMIDOL 85 ML: 612 INJECTION, SOLUTION INTRAVENOUS at 08:28

## 2021-06-18 ENCOUNTER — HOSPITAL ENCOUNTER (OUTPATIENT)
Dept: MRI IMAGING | Facility: HOSPITAL | Age: 56
Discharge: HOME | End: 2021-06-18
Admitting: OTOLARYNGOLOGY

## 2021-06-18 DIAGNOSIS — H93.A9 PULSATILE TINNITUS: ICD-10-CM

## 2021-06-18 DIAGNOSIS — H90.3 SENSORINEURAL HEARING LOSS (SNHL) OF BOTH EARS: ICD-10-CM

## 2021-06-18 PROCEDURE — 70553 MRI BRAIN STEM W/O & W/DYE: CPT

## 2021-06-18 PROCEDURE — 0 GADOBENATE DIMEGLUMINE 529 MG/ML SOLUTION: Performed by: OTOLARYNGOLOGY

## 2021-06-18 PROCEDURE — 82565 ASSAY OF CREATININE: CPT

## 2021-06-18 PROCEDURE — A9577 INJ MULTIHANCE: HCPCS | Performed by: OTOLARYNGOLOGY

## 2021-06-18 RX ADMIN — GADOBENATE DIMEGLUMINE 15 ML: 529 INJECTION, SOLUTION INTRAVENOUS at 20:31

## 2021-06-21 LAB — CREAT BLDA-MCNC: 1.2 MG/DL (ref 0.6–1.3)

## 2021-09-01 ENCOUNTER — OFFICE VISIT (OUTPATIENT)
Dept: INTERNAL MEDICINE | Facility: CLINIC | Age: 56
End: 2021-09-01

## 2021-09-01 VITALS
DIASTOLIC BLOOD PRESSURE: 72 MMHG | WEIGHT: 157.8 LBS | TEMPERATURE: 96.9 F | HEIGHT: 66 IN | OXYGEN SATURATION: 99 % | HEART RATE: 94 BPM | RESPIRATION RATE: 16 BRPM | SYSTOLIC BLOOD PRESSURE: 112 MMHG | BODY MASS INDEX: 25.36 KG/M2

## 2021-09-01 DIAGNOSIS — F42.2 MIXED OBSESSIONAL THOUGHTS AND ACTS: Chronic | ICD-10-CM

## 2021-09-01 DIAGNOSIS — E78.2 MIXED HYPERLIPIDEMIA: Primary | Chronic | ICD-10-CM

## 2021-09-01 PROBLEM — K21.9 GASTROESOPHAGEAL REFLUX DISEASE WITHOUT ESOPHAGITIS: Status: ACTIVE | Noted: 2021-09-01

## 2021-09-01 PROCEDURE — 99214 OFFICE O/P EST MOD 30 MIN: CPT | Performed by: NURSE PRACTITIONER

## 2021-09-01 RX ORDER — MULTIVIT-MIN/IRON/FOLIC ACID/K 18-600-40
CAPSULE ORAL
COMMUNITY
Start: 2019-02-15 | End: 2022-03-16 | Stop reason: SDUPTHER

## 2021-09-01 RX ORDER — AMOXICILLIN 500 MG/1
CAPSULE ORAL
COMMUNITY
Start: 2021-06-11 | End: 2022-02-18

## 2021-09-01 NOTE — PROGRESS NOTES
Chief Complaint  Hyperlipidemia     Subjective:      History of Present Illness {CC  Problem List  Visit  Diagnosis   Encounters  Notes  Medications  Labs  Result Review Imaging  Media :23}     Saray Green presents to [facility] for chronic medical conditions including: hyperlipidemia, depression/anxiety, gerd (Rheum just started protonix), MGUS, paresthesias     Since last visit - she was seen by ENT  Acute vestibular neuronitis   She states she will follow up with them again soon but for now she has decided against hearing aids.     She feels she had COVID during child's wedding.  One test positive - 2 negative.  Smell altered.      Hyperlipidemia - continues lipitor with no adverse effects.     Depression/anxiety - doing well on current dose of lexapro.    No further stomach issues.      Discussed shingles vaccine as well as COVID booster.     Objective:      Physical Exam  Vitals reviewed.   Constitutional:       Appearance: Normal appearance. She is well-developed.   HENT:      Head:      Comments: Wearing mask due to COVID   Cardiovascular:      Rate and Rhythm: Normal rate and regular rhythm.      Pulses: Normal pulses.      Heart sounds: Normal heart sounds.   Pulmonary:      Effort: Pulmonary effort is normal.      Breath sounds: Normal breath sounds.      Comments: E/U   Abdominal:      General: Bowel sounds are normal.   Musculoskeletal:      Right lower leg: No edema.      Left lower leg: No edema.   Skin:     General: Skin is warm and dry.      Capillary Refill: Capillary refill takes 2 to 3 seconds.   Neurological:      Mental Status: She is alert and oriented to person, place, and time.   Psychiatric:         Mood and Affect: Mood normal.         Behavior: Behavior normal. Behavior is cooperative.         Thought Content: Thought content normal.         Judgment: Judgment normal.        Result Review  Data Reviewed:{ Labs  Result Review  Imaging  " Med Tab  Media :23}   The following data was reviewed by: Verona Engel III, NP-C on 09/01/2021  Lab Results - Last 18 Months   Lab Units 08/19/21  1141 06/18/21  1948 06/16/21  0959 05/27/21  1156 03/12/21  1227 12/28/20  1524 07/20/20  1006 07/14/20  1634   GLUCOSE mg/dL  --   --  90  --   --   --  101* 87   BUN mg/dL  --   --  13  --   --  16 21* 18   CREATININE mg/dL  --  1.20 0.96  --   --  0.87 0.9 1.14*   EGFR IF NONAFRICN AM mL/min/1.73  --   --  60*  --   --  67  --  49*   EGFR IF AFRICN AM mL/min/1.73  --   --  73  --   --   --   --  60*   SODIUM mmol/L  --   --  143  --   --  137 140 141   POTASSIUM mmol/L  --   --  4.5  --   --  4.2 4.3 4.5   CHLORIDE mmol/L  --   --  105  --   --  104 106 104   CALCIUM mg/dL  --   --  9.6  --   --  9.2 8.9 9.2   ALBUMIN g/dL  --   --  4.30  --   --  4.10 4.1 4.60   BILIRUBIN mg/dL  --   --  0.5  --   --  0.4 0.5 0.6   ALK PHOS U/L  --   --  69  --   --  66 75 76   AST (SGOT) U/L  --   --  11  --   --  21 34 21   ALT (SGPT) U/L  --   --  15  --   --  21 29 25   CHOLESTEROL mg/dL  --   --  186  --  218*  --   --  208*   TRIGLYCERIDES mg/dL  --   --  141  --  154*  --   --  135   HDL CHOL mg/dL  --   --  53  --  50  --   --  52   VLDL CHOL mg/dL  --   --   --   --   --   --   --  27   VLDL CHOLESTEROL AYDEE mg/dL  --   --  25  --  28  --   --   --    LDL CHOL mg/dL  --   --  108*  --  140*  --   --  129*   LDL/HDL RATIO   --   --  1.98  --  2.74  --   --  2.48   WBC 10*3/uL 5.92  --   --  6.36  --  9.68 6.51 7.85   RBC 10*6/uL 4.59  --   --  4.89  --  4.40 4.64 4.49   HEMATOCRIT % 42.5  --   --  44.8  --  41.3 41.8 40.1   MCV fL 92.6  --   --  91.6  --  93.9 90.1 89.3   MCH pg 31.2  --   --  31.5  --  31.4 31.5 31.2          Vital Signs:   /72 (BP Location: Left arm, Patient Position: Sitting, Cuff Size: Adult)   Pulse 94   Temp 96.9 °F (36.1 °C) (Temporal)   Resp 16   Ht 167.6 cm (66\")   Wt 71.6 kg (157 lb 12.8 oz)   SpO2 99%   BMI 25.47 kg/m² "         Requested Prescriptions      No prescriptions requested or ordered in this encounter     Routine medications provided by this office will also be refilled via pharmacy request.       Current Outpatient Medications:   •  amoxicillin (AMOXIL) 500 MG capsule, TAKE 4 CAPSULES 1 HOUR PRIOR TO APPOINTMENT, Disp: , Rfl:   •  atorvastatin (LIPITOR) 20 MG tablet, Take 1 tablet by mouth Daily., Disp: 30 tablet, Rfl: 5  •  Cholecalciferol (Vitamin D) 50 MCG (2000 UT) capsule, , Disp: , Rfl:   •  diclofenac (VOLTAREN) 75 MG EC tablet, TK 1 T PO BID WF PRN, Disp: , Rfl: 7  •  escitalopram (LEXAPRO) 20 MG tablet, Take 1 tablet by mouth Daily., Disp: 90 tablet, Rfl: 2  •  montelukast (SINGULAIR) 10 MG tablet, Take 1 tablet by mouth Every Night., Disp: 30 tablet, Rfl: 11  •  pantoprazole (PROTONIX) 40 MG EC tablet, Take 40 mg by mouth Daily., Disp: , Rfl:   •  vitamin D (ERGOCALCIFEROL) 1.25 MG (37379 UT) capsule capsule, TAKE 1 CAPSULE BY MOUTH EVERY 7 DAYS, Disp: 13 capsule, Rfl: 0  •  cetirizine (zyrTEC) 10 MG tablet, Take 1 tablet by mouth Daily for 30 days., Disp: 30 tablet, Rfl: 0     Assessment and Plan:      Assessment and Plan {CC Problem List  Visit Diagnosis  ROS  Review (Popup)  Health Maintenance  Quality  BestPractice  Medications  SmartSets  SnapShot Encounters  Media :23}     Problem List Items Addressed This Visit        Cardiac and Vasculature    Hyperlipidemia - Primary (Chronic)    Current Assessment & Plan     Lipid abnormalities are improving with treatment.  Pharmacotherapy as ordered.  Lipids will be reassessed in 6 months.    Lab Results   Component Value Date    CHLPL 186 06/16/2021    TRIG 141 06/16/2021    HDL 53 06/16/2021     (H) 06/16/2021               Mental Health    Depression/Anxiety (Chronic)    Current Assessment & Plan     Improved on lexapro - continue                Follow Up {Instructions Charge Capture  Follow-up Communications :23}     Return in about 6  months (around 3/1/2022) for Annual physical.    Patient was given instructions and counseling regarding her condition or for health maintenance advice. Please see specific information pulled into the AVS if appropriate.    Mer disclaimer:   Much of this encounter note is an electronic transcription/translation of spoken language to printed text. The electronic translation of spoken language may permit erroneous, or at times, nonsensical words or phrases to be inadvertently transcribed; Although I have reviewed the note for such errors, some may still exist.     Additional Patient Counseling:       Patient Instructions   Vaccines:     Shingles vaccine is given in TWO separate injections.   CDC recommends that healthy adults 50 years and older get two doses of the shingles vaccine called Shingrix (recombinant zoster vaccine),  by 2 to 6 months, to prevent shingles and the complications from the disease.

## 2021-09-01 NOTE — ASSESSMENT & PLAN NOTE
Lipid abnormalities are improving with treatment.  Pharmacotherapy as ordered.  Lipids will be reassessed in 6 months.    Lab Results   Component Value Date    CHLPL 186 06/16/2021    TRIG 141 06/16/2021    HDL 53 06/16/2021     (H) 06/16/2021

## 2021-09-11 DIAGNOSIS — E55.9 VITAMIN D DEFICIENCY: ICD-10-CM

## 2021-09-13 RX ORDER — ATORVASTATIN CALCIUM 20 MG/1
20 TABLET, FILM COATED ORAL DAILY
Qty: 30 TABLET | Refills: 5 | Status: SHIPPED | OUTPATIENT
Start: 2021-09-13 | End: 2022-03-10

## 2021-09-13 RX ORDER — ERGOCALCIFEROL 1.25 MG/1
CAPSULE ORAL
Qty: 13 CAPSULE | Refills: 0 | Status: SHIPPED | OUTPATIENT
Start: 2021-09-13 | End: 2021-12-17

## 2021-09-20 ENCOUNTER — TRANSCRIBE ORDERS (OUTPATIENT)
Dept: ADMINISTRATIVE | Facility: HOSPITAL | Age: 56
End: 2021-09-20

## 2021-09-20 ENCOUNTER — HOSPITAL ENCOUNTER (OUTPATIENT)
Dept: CARDIOLOGY | Facility: HOSPITAL | Age: 56
Discharge: HOME | End: 2021-09-20
Admitting: PLASTIC SURGERY

## 2021-09-20 DIAGNOSIS — Z01.818 PRE-OP EXAM: Primary | ICD-10-CM

## 2021-09-20 DIAGNOSIS — Z01.818 PRE-OP EXAM: ICD-10-CM

## 2021-09-20 LAB — QT INTERVAL: 406 MS

## 2021-09-20 PROCEDURE — 93005 ELECTROCARDIOGRAM TRACING: CPT | Performed by: PLASTIC SURGERY

## 2021-09-20 PROCEDURE — 93010 ELECTROCARDIOGRAM REPORT: CPT | Performed by: INTERNAL MEDICINE

## 2021-11-29 ENCOUNTER — TRANSCRIBE ORDERS (OUTPATIENT)
Dept: ADMINISTRATIVE | Facility: HOSPITAL | Age: 56
End: 2021-11-29

## 2021-11-29 DIAGNOSIS — H93.13 TINNITUS OF BOTH EARS: ICD-10-CM

## 2021-11-29 DIAGNOSIS — H90.3 SENSORY HEARING LOSS, BILATERAL: Primary | ICD-10-CM

## 2021-12-06 ENCOUNTER — HOSPITAL ENCOUNTER (OUTPATIENT)
Dept: CARDIOLOGY | Facility: HOSPITAL | Age: 56
Discharge: HOME | End: 2021-12-06
Admitting: INTERNAL MEDICINE

## 2021-12-06 DIAGNOSIS — H93.13 TINNITUS OF BOTH EARS: ICD-10-CM

## 2021-12-06 DIAGNOSIS — H90.3 SENSORY HEARING LOSS, BILATERAL: ICD-10-CM

## 2021-12-06 LAB
BH CV XLRA MEAS LEFT DIST CCA EDV: 24.6 CM/SEC
BH CV XLRA MEAS LEFT DIST CCA PSV: 82.7 CM/SEC
BH CV XLRA MEAS LEFT DIST ICA EDV: 24.6 CM/SEC
BH CV XLRA MEAS LEFT DIST ICA PSV: 66.1 CM/SEC
BH CV XLRA MEAS LEFT ICA/CCA RATIO: 1.19
BH CV XLRA MEAS LEFT MID ICA EDV: 38.7 CM/SEC
BH CV XLRA MEAS LEFT MID ICA PSV: 98.5 CM/SEC
BH CV XLRA MEAS LEFT PROX CCA EDV: 29.1 CM/SEC
BH CV XLRA MEAS LEFT PROX CCA PSV: 109 CM/SEC
BH CV XLRA MEAS LEFT PROX ECA EDV: 15.2 CM/SEC
BH CV XLRA MEAS LEFT PROX ECA PSV: 82.1 CM/SEC
BH CV XLRA MEAS LEFT PROX ICA EDV: 28.7 CM/SEC
BH CV XLRA MEAS LEFT PROX ICA PSV: 74.5 CM/SEC
BH CV XLRA MEAS LEFT PROX SCLA PSV: 141 CM/SEC
BH CV XLRA MEAS LEFT VERTEBRAL A EDV: 19.3 CM/SEC
BH CV XLRA MEAS LEFT VERTEBRAL A PSV: 52.7 CM/SEC
BH CV XLRA MEAS RIGHT DIST CCA EDV: 31.7 CM/SEC
BH CV XLRA MEAS RIGHT DIST CCA PSV: 95.6 CM/SEC
BH CV XLRA MEAS RIGHT DIST ICA EDV: 34.6 CM/SEC
BH CV XLRA MEAS RIGHT DIST ICA PSV: 81.5 CM/SEC
BH CV XLRA MEAS RIGHT ICA/CCA RATIO: 1.15
BH CV XLRA MEAS RIGHT MID ICA EDV: 35.8 CM/SEC
BH CV XLRA MEAS RIGHT MID ICA PSV: 91.5 CM/SEC
BH CV XLRA MEAS RIGHT PROX CCA EDV: 30.5 CM/SEC
BH CV XLRA MEAS RIGHT PROX CCA PSV: 103 CM/SEC
BH CV XLRA MEAS RIGHT PROX ECA EDV: 16.4 CM/SEC
BH CV XLRA MEAS RIGHT PROX ECA PSV: 108 CM/SEC
BH CV XLRA MEAS RIGHT PROX ICA EDV: 38.1 CM/SEC
BH CV XLRA MEAS RIGHT PROX ICA PSV: 110 CM/SEC
BH CV XLRA MEAS RIGHT PROX SCLA PSV: 170 CM/SEC
BH CV XLRA MEAS RIGHT VERTEBRAL A EDV: 14.9 CM/SEC
BH CV XLRA MEAS RIGHT VERTEBRAL A PSV: 48.3 CM/SEC
LEFT ARM BP: NORMAL MMHG
MAXIMAL PREDICTED HEART RATE: 163 BPM
RIGHT ARM BP: NORMAL MMHG
STRESS TARGET HR: 139 BPM

## 2021-12-06 PROCEDURE — 93880 EXTRACRANIAL BILAT STUDY: CPT

## 2021-12-07 ENCOUNTER — APPOINTMENT (OUTPATIENT)
Dept: WOMENS IMAGING | Facility: HOSPITAL | Age: 56
End: 2021-12-07

## 2021-12-07 PROCEDURE — 76641 ULTRASOUND BREAST COMPLETE: CPT | Performed by: RADIOLOGY

## 2021-12-07 PROCEDURE — 77062 BREAST TOMOSYNTHESIS BI: CPT | Performed by: RADIOLOGY

## 2021-12-07 PROCEDURE — G0279 TOMOSYNTHESIS, MAMMO: HCPCS | Performed by: RADIOLOGY

## 2021-12-07 PROCEDURE — 77066 DX MAMMO INCL CAD BI: CPT | Performed by: RADIOLOGY

## 2021-12-17 DIAGNOSIS — E55.9 VITAMIN D DEFICIENCY: ICD-10-CM

## 2021-12-17 RX ORDER — ERGOCALCIFEROL 1.25 MG/1
CAPSULE ORAL
Qty: 13 CAPSULE | Refills: 0 | Status: SHIPPED | OUTPATIENT
Start: 2021-12-17

## 2022-02-14 ENCOUNTER — TELEPHONE (OUTPATIENT)
Dept: NEUROLOGY | Facility: CLINIC | Age: 57
End: 2022-02-14

## 2022-02-14 NOTE — TELEPHONE ENCOUNTER
Provider:   Caller: PATIENT  Relationship to Patient: SELF  Pharmacy: NA  Phone Number: [phone]  Reason for Call: PATIENT STATES HAVING NEW ISSUES SHE WANTS TO SEE  FOR SUCH AS TINNITUS AND PHANTOM SMELLS. PLEASE ADVISE.   When was the patient last seen: 7-14-20

## 2022-02-18 ENCOUNTER — OFFICE VISIT (OUTPATIENT)
Dept: NEUROLOGY | Facility: CLINIC | Age: 57
End: 2022-02-18

## 2022-02-18 VITALS
HEART RATE: 86 BPM | OXYGEN SATURATION: 98 % | SYSTOLIC BLOOD PRESSURE: 122 MMHG | HEIGHT: 66 IN | DIASTOLIC BLOOD PRESSURE: 78 MMHG | WEIGHT: 156 LBS | BODY MASS INDEX: 25.07 KG/M2

## 2022-02-18 DIAGNOSIS — H93.A3 PULSATILE TINNITUS OF BOTH EARS: ICD-10-CM

## 2022-02-18 DIAGNOSIS — R43.1 PAROSMIA: Primary | ICD-10-CM

## 2022-02-18 PROCEDURE — 99215 OFFICE O/P EST HI 40 MIN: CPT | Performed by: PHYSICIAN ASSISTANT

## 2022-02-18 RX ORDER — FLUTICASONE PROPIONATE 50 MCG
1 SPRAY, SUSPENSION (ML) NASAL DAILY
Qty: 10 ML | Refills: 2 | Status: SHIPPED | OUTPATIENT
Start: 2022-02-18 | End: 2022-09-20

## 2022-02-18 NOTE — PROGRESS NOTES
CC: Follow-up pulsatile tinnitus, parosmia     HPI:  Saray Green is a  56 y.o.  right-handed female I am seeing in follow-up today regarding some longstanding pulsatile tinnitus and some newer onset phantosmia.  Past medical history is notable for mixed connective tissue disorder, osteoarthritis, hyperlipidemia, nonischemic cardiomyopathy.  She was last seen in 2020 by Dr. Parra for evaluation of some paresthesias.  Her work-up included included some labs which did disclose an incidental finding of an IgG/kappa MGUS . Nerve conductions and needle EMG were also performed and did not disclose any evidence of a neuropathic finding in the upper extremities. Patient reports her sensory symptoms that she was experiencing are now since mostly resolved.      Today she reports a fairly longstanding history of some pulsatile tinnitus.  She thinks it started maybe about 2-1/2 years ago.  She states this problem has progressed since the onset and does now seem to be quite severe, it is constant.  She is already had a fairly extensive work-up for this, has been seen by ENT.  She does have a family history of a cerebral aneurysm in her father and so an MRA was ordered near the onset of her symptoms in 2018, it did not show any aneurysm or high-grade stenosis.  More recently in June 2021 patient underwent an MRI of her brain, this was essentially normal as well -noted that the internal auditory canals and temporal bones as a whole are essentially unremarkable.    Patient also reports that about 9 months ago she developed some upper respiratory symptoms that seem to really linger.  She states it was at this time that she started noticing  phantom, unpleasant odor.  At first it was very mild and intermittent.  She later got Covid in August 2021 and completely lost her sense of taste and smell.  This came back after about 2 weeks and again she was experiencing the parosmia.  As of late it seems to have progressed in both the  severity and frequency, she states in particular it seems to be triggered by other smells -strong food odors in particular.  She denies any headache, dizziness or lightheadedness, numbness, tingling, imbalance or gait disturbance.  She does report a history of vertigo, but has not had any episodes lately.  She does indicate some chronic low back pain but denies any radiation and states it is not any worse than her baseline.    Patient denies any abnormal environmental exposures.  No new medications.  She is a never smoker.    Past Medical History:   Diagnosis Date   • Cardiomyopathy (HCC)     0CT 1999   OFF MEDS AFTER RESOLVED   HOLTER MONITOR APRIL 2016   • Heart palpitations     COMES AND GOES  24 HOLTER DONE 4-13-16   NO RESULTS YET   • History of migraine    • MGUS (monoclonal gammopathy of unknown significance)    • Migraine    • Osteoarthritis    • Seasonal allergies    • Shoulder pain, right          Past Surgical History:   Procedure Laterality Date   • ANKLE SURGERY     • D & C HYSTEROSCOPY WITH NOVASURE ENDOMETRIAL ABLATION AND MYOSURE     • DILATATION AND CURETTAGE      COUPLE   • KNEE ARTHROSCOPY      AGE 11   • SHOULDER ACROMIOPLASTY Left     X 2   • SHOULDER ARTHROSCOPY Right 4/28/2016    Procedure: RT SHOULDER ARTHROSCOPY WITH ACROMIOPLSTY, LABRAL DEBRIDEMENT  AND DISTAL CLAVICLE EXCISION;  Surgeon: Rikki Morel MD;  Location: [facility] OR [facility] [place];  Service:        Current Outpatient Medications:   •  atorvastatin (LIPITOR) 20 MG tablet, TAKE 1 TABLET BY MOUTH DAILY, Disp: 30 tablet, Rfl: 5  •  Cholecalciferol (Vitamin D) 50 MCG (2000 UT) capsule, , Disp: , Rfl:   •  diclofenac (VOLTAREN) 75 MG EC tablet, TK 1 T PO BID WF PRN, Disp: , Rfl: 7  •  escitalopram (LEXAPRO) 20 MG tablet, Take 1 tablet by mouth Daily., Disp: 90 tablet, Rfl: 2  •  vitamin D (ERGOCALCIFEROL) 1.25 MG (64364 UT) capsule capsule, TAKE 1 CAPSULE BY MOUTH EVERY 7 DAYS, Disp: 13 capsule, Rfl: 0  •  fluticasone (Flonase) 50 MCG/ACT nasal  "spray, 1 spray into the nostril(s) as directed by provider Daily for 30 days., Disp: 10 mL, Rfl: 2      Family History   Problem Relation Age of Onset   • Hypertension Mother    • Diabetes Mother    • Cancer Mother    • Thyroid disease Mother    • Hypertension Father    • Aneurysm Father    • Stroke Paternal Grandfather    • Cancer Paternal Grandfather          Social History     Socioeconomic History   • Marital status:    Tobacco Use   • Smoking status: Never Smoker   • Smokeless tobacco: Never Used   Substance and Sexual Activity   • Alcohol use: Yes     Comment: SELDOM   • Drug use: No   • Sexual activity: Defer         Allergies   Allergen Reactions   • Macrobid [Nitrofurantoin] Rash       ROS:  Review of Systems   Constitutional: Negative for activity change, appetite change and fatigue.   Eyes: Negative for pain, redness and itching.   Allergic/Immunologic: Negative for environmental allergies and food allergies.   Neurological: Negative for dizziness, tremors, seizures, syncope, facial asymmetry, speech difficulty, weakness, light-headedness, numbness and headaches.   Psychiatric/Behavioral: Negative for agitation, behavioral problems, confusion, decreased concentration, dysphoric mood, hallucinations, self-injury, sleep disturbance and suicidal ideas. The patient is not nervous/anxious and is not hyperactive.      I have reviewed the above ROS put in by the medical assistant and am in agreement.     Physical Exam:  Vitals:    02/18/22 0917   BP: 122/78   Pulse: 86   SpO2: 98%   Weight: 70.8 kg (156 lb)   Height: 167.6 cm (66\")       Body mass index is 25.18 kg/m².    Physical Exam  General: Overweight white female no acute distress  HEENT: Normocephalic no evidence of trauma.   Throat negative.  Neck: Supple.  No thyromegaly.  No cervical bruits.    Heart: Regular rate and rhythm no murmurs  Extremities: No pedal edema.  Distal pulses palpable and equal. Probable cavernous hemangioma in the left hand " mostly palm surface.        Neurological Exam:   Mental Status: Awake, alert, oriented to person, place and time.  Conversant without evidence of an affective disorder, thought disorder, delusions or hallucinations.  Attention span and concentration are normal.  HCF: No aphasia, apraxia or dysarthria.  Recent and remote memory intact.  Knowledge of recent events intact.  CN:      I:              II: Visual fields full without left inattention              III, IV, VI: Eye movements intact without nystagmus or ptosis.  Pupils equal round and reactive to light.              V,VII: Light touch and pinprick intact all 3 divisions of V.  Facial muscles symmetrical.              VIII: Hearing diminished bilaterally, absent finger rub on both sides              IX,X: Soft palate elevates symmetrically              XI: Sternomastoid and trapezius are strong.              XII: Tongue midline without atrophy or fasciculations  Motor: Normal tone and bulk in the upper and lower extremities              Power testing: Full power in all muscles tested in the arms and legs  Reflexes: Upper extremities: +2 diffusely                   Lower extremities: +2 at knees +1 at ankles                   Toe signs: Equivocal bilaterally  Sensory: Light touch: Diffusely intact arms and leg                   Pinprick: Diffusely intact arms and legs                   Vibration: slightly reduced on left ankle, normal on right  Cerebellar: Finger-to-nose: Normal                      Rapid movement: Normal                      Heel-to-shin: Normal  Gait and Station:  Patient comes to stand without difficulty.  Normal casual, toe, heel and tandem walk.  No Romberg no drift.    Results:      Lab Results   Component Value Date    GLUCOSE 90 06/16/2021    BUN 13 06/16/2021    CREATININE 1.20 06/18/2021    EGFRIFNONA 60 (L) 06/16/2021    EGFRIFAFRI 73 06/16/2021    BCR 13.5 06/16/2021    CO2 26.2 06/16/2021    CALCIUM 9.6 06/16/2021    PROTENTOTREF  6.4 06/16/2021    ALBUMIN 4.30 06/16/2021    LABIL2 2.0 06/16/2021    AST 11 06/16/2021    ALT 15 06/16/2021       Lab Results   Component Value Date    WBC 5.92 08/19/2021    HGB 14.3 08/19/2021    HCT 42.5 08/19/2021    MCV 92.6 08/19/2021     08/19/2021         .No results found for: RPR      Lab Results   Component Value Date    TSH 2.160 01/31/2019    O6DLXSW 115.2 01/31/2019    B5GBSYM 6.66 01/31/2019    THYROIDAB <3.00 07/17/2018         Lab Results   Component Value Date    AYLSSBXG25 255 04/26/2018         Lab Results   Component Value Date    FOLATE 5.2 03/16/2018         No results found for: HGBA1C      Lab Results   Component Value Date    GLUCOSE 90 06/16/2021    BUN 13 06/16/2021    CREATININE 1.20 06/18/2021    EGFRIFNONA 60 (L) 06/16/2021    EGFRIFAFRI 73 06/16/2021    BCR 13.5 06/16/2021    K 4.5 06/16/2021    CO2 26.2 06/16/2021    CALCIUM 9.6 06/16/2021    PROTENTOTREF 6.4 06/16/2021    ALBUMIN 4.30 06/16/2021    LABIL2 2.0 06/16/2021    AST 11 06/16/2021    ALT 15 06/16/2021         Lab Results   Component Value Date    WBC 5.92 08/19/2021    HGB 14.3 08/19/2021    HCT 42.5 08/19/2021    MCV 92.6 08/19/2021     08/19/2021       Assessment:   1.  Parosmia   2.  Chronic pulsatile tinnitus    Plan:  1.  Patient's physical exam is really quite normal today, there is really no sign of CNS dysfunction.  She had an MRI of her brain about 8 months ago which was normal.  I suspect the etiology of her parasomnia is from some sort of nasal/sinus disease -as symptom onset seem to coincide with an upper respiratory infection.  Additionally her symptoms were amplified after a bout of Covid so potentially there could be a postviral component as well.   - For now would recommend trying some conservative measures, olfactory training. Advise that this involves deeply sniffing at least four different odors for 10 seconds twice daily for at least 12 weeks.   - Additionally as she does report some  chronic rhinorrhea I think a trial of Flonase could also be helpful.    2.  As far as her tinnitus, this is a chronic issue, patient has already had angiography which was normal showing no vascular abnormality.  We did discuss that sometimes venous hums or benign arterial variants can cause pulsatile tinnitus, but no intervention is necessary.   -  Apparently she recently had her hearing tested and did show a deficit.  Discussed that hearing loss can sometimes exacerbate tinnitus and treating hearing loss (with hearing aids) may also help to treat the tinnitus.  Continue to monitor.    Plan on follow-up in 6 months or sooner should need arise      Time 40 minutes      Dictated utilizing Dragon dictation.

## 2022-03-10 RX ORDER — ATORVASTATIN CALCIUM 20 MG/1
20 TABLET, FILM COATED ORAL DAILY
Qty: 30 TABLET | Refills: 3 | OUTPATIENT
Start: 2022-03-10

## 2022-03-10 RX ORDER — ATORVASTATIN CALCIUM 20 MG/1
20 TABLET, FILM COATED ORAL DAILY
Qty: 30 TABLET | Refills: 3 | Status: SHIPPED | OUTPATIENT
Start: 2022-03-10 | End: 2022-03-16 | Stop reason: SDUPTHER

## 2022-03-16 ENCOUNTER — OFFICE VISIT (OUTPATIENT)
Dept: INTERNAL MEDICINE | Facility: CLINIC | Age: 57
End: 2022-03-16

## 2022-03-16 VITALS
OXYGEN SATURATION: 98 % | HEIGHT: 66 IN | TEMPERATURE: 97.8 F | HEART RATE: 92 BPM | BODY MASS INDEX: 24.98 KG/M2 | WEIGHT: 155.4 LBS | SYSTOLIC BLOOD PRESSURE: 132 MMHG | DIASTOLIC BLOOD PRESSURE: 83 MMHG

## 2022-03-16 DIAGNOSIS — F42.2 MIXED OBSESSIONAL THOUGHTS AND ACTS: ICD-10-CM

## 2022-03-16 DIAGNOSIS — M15.9 OSTEOARTHRITIS INVOLVING MULTIPLE JOINTS ON BOTH SIDES OF BODY: Chronic | ICD-10-CM

## 2022-03-16 DIAGNOSIS — Z00.00 ANNUAL PHYSICAL EXAM: Primary | ICD-10-CM

## 2022-03-16 DIAGNOSIS — E78.2 MIXED HYPERLIPIDEMIA: Chronic | ICD-10-CM

## 2022-03-16 DIAGNOSIS — F42.2 MIXED OBSESSIONAL THOUGHTS AND ACTS: Chronic | ICD-10-CM

## 2022-03-16 DIAGNOSIS — E55.9 VITAMIN D DEFICIENCY: Chronic | ICD-10-CM

## 2022-03-16 DIAGNOSIS — K57.90 DIVERTICULOSIS: Chronic | ICD-10-CM

## 2022-03-16 PROCEDURE — 99396 PREV VISIT EST AGE 40-64: CPT | Performed by: NURSE PRACTITIONER

## 2022-03-16 RX ORDER — ATORVASTATIN CALCIUM 20 MG/1
20 TABLET, FILM COATED ORAL DAILY
Qty: 90 TABLET | Refills: 1 | Status: SHIPPED | OUTPATIENT
Start: 2022-03-16

## 2022-03-16 RX ORDER — MULTIVIT-MIN/IRON/FOLIC ACID/K 18-600-40
2000 CAPSULE ORAL DAILY
Qty: 90 CAPSULE | Refills: 5 | Status: SHIPPED | OUTPATIENT
Start: 2022-03-16 | End: 2022-09-20

## 2022-03-16 RX ORDER — ESCITALOPRAM OXALATE 20 MG/1
20 TABLET ORAL DAILY
Qty: 90 TABLET | Refills: 1 | Status: SHIPPED | OUTPATIENT
Start: 2022-03-16 | End: 2022-09-20

## 2022-03-16 NOTE — PROGRESS NOTES
Chief Complaint  Annual Exam     Subjective:      History of Present Illness {CC  Problem List  Visit  Diagnosis   Encounters  Notes  Medications  Labs  Result Review Imaging  Media :23}     Saray Green presents to [facility] for annual exam excluding GYN exam.     She still has tinnitus that has been evaluated by ENT and neurology.   She was having phantom smells.  She was seen by neurology: felt some hearing lost could be contributing.     MGUS followed by Jhonny Nova  She has seen Dr Tomlinson, Rheumatology.        Saray Cadet is here for coordination of medical care, to discuss health maintenance, disease prevention as well as to followup on medical problems.     Patient Care Team:  Verona Engel III, NP-C as PCP - General (Family Medicine)  Alicia Palomino MD as Consulting Physician (Obstetrics)  Juan C Parra MD as Consulting Physician (Neurology)  Jhonny Nova MD as Consulting Physician (Hematology and Oncology)  Delvis Schmitt MD as Consulting Physician (Otolaryngology)  Melva Chowdhury MD as Consulting Physician (Dermatology)     Activity level is moderate.     Weight trend is stable.      Wt Readings from Last 4 Encounters:   03/16/22 70.5 kg (155 lb 6.4 oz)   02/18/22 70.8 kg (156 lb)   09/01/21 71.6 kg (157 lb 12.8 oz)   05/27/21 76.9 kg (169 lb 9.6 oz)         Health Maintenance Female:    · GYN: Alicia Palomino   · Last gynecology appointment: 8/3/2021  · Advised routine self-breast exams monthly.    Colon cancer screen:   She has no change in bowel habits.   Patient's last colonoscopy was 7/20/2021.   She was advised to repeat in 5 years.  [facility]   COLONOSCOPY - SCAN - COLONOSCOPY, [facility], 7/20/2021 (07/20/2021)  She states was told 7 years: she will verify     Vaccines: shingles (states waiting)      Last eye exam: 2020     Advised regular sunscreen.   (Just returned from [place])     I have reviewed  patient's medical history, any new submitted information provided by patient or medical assistant and updated medical record.      Objective:      Physical Exam  Vitals reviewed.   Constitutional:       Appearance: Normal appearance. She is well-developed.   HENT:      Head:      Comments: Wearing mask due to COVID   Eyes:      Pupils: Pupils are equal, round, and reactive to light.   Neck:      Thyroid: No thyromegaly.   Cardiovascular:      Rate and Rhythm: Normal rate and regular rhythm.      Pulses: Normal pulses.      Heart sounds: Normal heart sounds.   Pulmonary:      Effort: Pulmonary effort is normal.      Breath sounds: Normal breath sounds.      Comments: E/U   Abdominal:      General: Bowel sounds are normal.      Palpations: Abdomen is soft.   Musculoskeletal:         General: Normal range of motion.      Cervical back: Normal range of motion and neck supple.      Right lower leg: No edema.      Left lower leg: No edema.   Lymphadenopathy:      Cervical: No cervical adenopathy.   Skin:     General: Skin is warm and dry.      Capillary Refill: Capillary refill takes 2 to 3 seconds.   Neurological:      Mental Status: She is alert and oriented to person, place, and time.   Psychiatric:         Mood and Affect: Mood normal.         Behavior: Behavior normal. Behavior is cooperative.         Thought Content: Thought content normal.         Judgment: Judgment normal.        Result Review  Data Reviewed:{ Labs  Result Review  Imaging  Med Tab  Media :23}     The following data was reviewed by: Verona Engel III, NP-C on 03/16/2022  Common labs    Common Labsle 6/16/21 6/16/21 6/18/21 8/19/21    0959 0959     Glucose  90     BUN  13     Creatinine  0.96 1.20    eGFR Non  Am  60 (A)     eGFR African Am  73     Sodium  143     Potassium  4.5     Chloride  105     Calcium  9.6     Total Protein  6.4     Albumin  4.30     Total Bilirubin  0.5     Alkaline Phosphatase  69     AST (SGOT)  11  "    ALT (SGPT)  15     WBC    5.92   Hemoglobin    14.3   Hematocrit    42.5   Platelets    268   Total Cholesterol 186      Triglycerides 141      HDL Cholesterol 53      LDL Cholesterol  108 (A)      (A) Abnormal value       Comments are available for some flowsheets but are not being displayed.                  Vital Signs:   /83 (BP Location: Left arm, Patient Position: Sitting, Cuff Size: Adult)   Pulse 92   Temp 97.8 °F (36.6 °C) (Temporal)   Ht 167.6 cm (66\")   Wt 70.5 kg (155 lb 6.4 oz)   SpO2 98%   BMI 25.08 kg/m²         Requested Prescriptions     Signed Prescriptions Disp Refills   • Cholecalciferol (Vitamin D) 50 MCG (2000 UT) capsule 90 capsule 5     Sig: Take 1 capsule by mouth Daily.   • atorvastatin (LIPITOR) 20 MG tablet 90 tablet 1     Sig: Take 1 tablet by mouth Daily.   • escitalopram (LEXAPRO) 20 MG tablet 90 tablet 1     Sig: Take 1 tablet by mouth Daily.       Routine medications provided by this office will also be refilled via pharmacy request.       Current Outpatient Medications:   •  atorvastatin (LIPITOR) 20 MG tablet, Take 1 tablet by mouth Daily., Disp: 90 tablet, Rfl: 1  •  Cholecalciferol (Vitamin D) 50 MCG (2000 UT) capsule, Take 1 capsule by mouth Daily., Disp: 90 capsule, Rfl: 5  •  diclofenac (VOLTAREN) 75 MG EC tablet, TK 1 T PO BID WF PRN, Disp: , Rfl: 7  •  escitalopram (LEXAPRO) 20 MG tablet, Take 1 tablet by mouth Daily., Disp: 90 tablet, Rfl: 1  •  fluticasone (Flonase) 50 MCG/ACT nasal spray, 1 spray into the nostril(s) as directed by provider Daily for 30 days., Disp: 10 mL, Rfl: 2  •  vitamin D (ERGOCALCIFEROL) 1.25 MG (50000 UT) capsule capsule, TAKE 1 CAPSULE BY MOUTH EVERY 7 DAYS, Disp: 13 capsule, Rfl: 0     Assessment and Plan:      Assessment and Plan {CC Problem List  Visit Diagnosis  ROS  Review (Popup)  Health Maintenance  Quality  BestPractice  Medications  SmartSets  SnapShot Encounters  Media :23}     Problem List Items Addressed This " Visit        Cardiac and Vasculature    Hyperlipidemia (Chronic)    Current Assessment & Plan     Lipid abnormalities are improving with treatment.  Pharmacotherapy as ordered.  Lipids will be reassessed in 6 months.    Lab Results   Component Value Date    CHLPL 186 06/16/2021    TRIG 141 06/16/2021    HDL 53 06/16/2021     (H) 06/16/2021              Relevant Medications    atorvastatin (LIPITOR) 20 MG tablet    Other Relevant Orders    Lipid Panel With LDL / HDL Ratio       Endocrine and Metabolic    Vitamin D deficiency (Chronic)    Current Assessment & Plan     Stable   Check lab for ongoing therapeutic drug monitoring            Relevant Medications    Cholecalciferol (Vitamin D) 50 MCG (2000 UT) capsule    Other Relevant Orders    Vitamin D 25 hydroxy       Gastrointestinal Abdominal     Diverticulosis (Chronic)    Overview     Advised life long high fiber diet               Mental Health    Depression/Anxiety (Chronic)    Relevant Medications    escitalopram (LEXAPRO) 20 MG tablet       Musculoskeletal and Injuries    Osteoarthritis involving multiple joints on both sides of body (Chronic)      Other Visit Diagnoses     Annual physical exam    -  Primary    Relevant Medications    Cholecalciferol (Vitamin D) 50 MCG (2000 UT) capsule    Other Relevant Orders    Comprehensive Metabolic Panel    Lipid Panel With LDL / HDL Ratio    CBC (No Diff)          Follow Up {Instructions Charge Capture  Follow-up Communications :23}     Return in about 6 months (around 9/16/2022).    Patient was given instructions and counseling regarding her condition or for health maintenance advice. Please see specific information pulled into the AVS if appropriate.    Mer disclaimer:   Much of this encounter note is an electronic transcription/translation of spoken language to printed text. The electronic translation of spoken language may permit erroneous, or at times, nonsensical words or phrases to be inadvertently  transcribed; Although I have reviewed the note for such errors, some may still exist.     Additional Patient Counseling:       Patient Instructions   Diet:    • Eat vegetables, fruits, whole grain, low-fat dairy, poultry, fish, beans, nontropical vegetable oils, and nuts, but avoid red meat (i.e., Mediterranean-style diet, DASH [Dietary Approaches to Stop Hypertension] diet).  • Limit sugary drinks and sweets.  • Limit saturated and trans fat to 5% to 6% of calories.  • Limit sodium intake to 2,400 mg daily (about one teaspoon table salt [kosher/sea salt have less sodium per teaspoon]).  Weight loss / Calorie Counting Apps:    • Lose It!   • MyFitness Pal   • Works great when you try it with a partner/ friend  Exercise:   • Engage in moderate-to-vigorous aerobic activity for at least 40 minutes (on average) three to four times each week.  Wearables:   • Activity tracker   • Step tracker   Skin Care:   • Use sun screen SPF >30 daily  • Dermatologist for skin check regularly  Bone Health:   • [email or web address]    CDC recommends Flu vaccines for everyone 6 months and older every season with rare exceptions.

## 2022-03-16 NOTE — PATIENT INSTRUCTIONS
Diet:    Eat vegetables, fruits, whole grain, low-fat dairy, poultry, fish, beans, nontropical vegetable oils, and nuts, but avoid red meat (i.e., Mediterranean-style diet, DASH [Dietary Approaches to Stop Hypertension] diet).  Limit sugary drinks and sweets.  Limit saturated and trans fat to 5% to 6% of calories.  Limit sodium intake to 2,400 mg daily (about one teaspoon table salt [kosher/sea salt have less sodium per teaspoon]).  Weight loss / Calorie Counting Apps:    Lose It!   5k Fans Pal   Works great when you try it with a partner/ friend  Exercise:   Engage in moderate-to-vigorous aerobic activity for at least 40 minutes (on average) three to four times each week.  Wearables:   Activity tracker   Step tracker   Skin Care:   Use sun screen SPF >30 daily  Dermatologist for skin check regularly  Bone Health:   [email or web address]    CDC recommends Flu vaccines for everyone 6 months and older every season with rare exceptions.

## 2022-03-17 LAB
25(OH)D3+25(OH)D2 SERPL-MCNC: 62.6 NG/ML (ref 30–100)
ALBUMIN SERPL-MCNC: 4.6 G/DL (ref 3.8–4.9)
ALBUMIN/GLOB SERPL: 1.8 {RATIO} (ref 1.2–2.2)
ALP SERPL-CCNC: 91 IU/L (ref 44–121)
ALT SERPL-CCNC: 14 IU/L (ref 0–32)
AST SERPL-CCNC: 15 IU/L (ref 0–40)
BILIRUB SERPL-MCNC: 0.5 MG/DL (ref 0–1.2)
BUN SERPL-MCNC: 18 MG/DL (ref 6–24)
BUN/CREAT SERPL: 18 (ref 9–23)
CALCIUM SERPL-MCNC: 9.7 MG/DL (ref 8.7–10.2)
CHLORIDE SERPL-SCNC: 103 MMOL/L (ref 96–106)
CHOLEST SERPL-MCNC: 236 MG/DL (ref 100–199)
CO2 SERPL-SCNC: 22 MMOL/L (ref 20–29)
CREAT SERPL-MCNC: 0.99 MG/DL (ref 0.57–1)
EGFRCR SERPLBLD CKD-EPI 2021: 67 ML/MIN/1.73
ERYTHROCYTE [DISTWIDTH] IN BLOOD BY AUTOMATED COUNT: 12.3 % (ref 11.7–15.4)
GLOBULIN SER CALC-MCNC: 2.5 G/DL (ref 1.5–4.5)
GLUCOSE SERPL-MCNC: 95 MG/DL (ref 65–99)
HCT VFR BLD AUTO: 42.5 % (ref 34–46.6)
HDLC SERPL-MCNC: 64 MG/DL
HGB BLD-MCNC: 14.6 G/DL (ref 11.1–15.9)
LDLC SERPL CALC-MCNC: 159 MG/DL (ref 0–99)
LDLC/HDLC SERPL: 2.5 RATIO (ref 0–3.2)
MCH RBC QN AUTO: 31 PG (ref 26.6–33)
MCHC RBC AUTO-ENTMCNC: 34.4 G/DL (ref 31.5–35.7)
MCV RBC AUTO: 90 FL (ref 79–97)
PLATELET # BLD AUTO: 308 X10E3/UL (ref 150–450)
POTASSIUM SERPL-SCNC: 4.9 MMOL/L (ref 3.5–5.2)
PROT SERPL-MCNC: 7.1 G/DL (ref 6–8.5)
RBC # BLD AUTO: 4.71 X10E6/UL (ref 3.77–5.28)
SODIUM SERPL-SCNC: 141 MMOL/L (ref 134–144)
TRIGL SERPL-MCNC: 76 MG/DL (ref 0–149)
VLDLC SERPL CALC-MCNC: 13 MG/DL (ref 5–40)
WBC # BLD AUTO: 7.3 X10E3/UL (ref 3.4–10.8)

## 2022-06-08 ENCOUNTER — APPOINTMENT (OUTPATIENT)
Dept: WOMENS IMAGING | Facility: HOSPITAL | Age: 57
End: 2022-06-08

## 2022-06-08 PROCEDURE — 76641 ULTRASOUND BREAST COMPLETE: CPT | Performed by: RADIOLOGY

## 2022-06-08 PROCEDURE — 77062 BREAST TOMOSYNTHESIS BI: CPT | Performed by: RADIOLOGY

## 2022-06-08 PROCEDURE — G0279 TOMOSYNTHESIS, MAMMO: HCPCS | Performed by: RADIOLOGY

## 2022-06-08 PROCEDURE — 77066 DX MAMMO INCL CAD BI: CPT | Performed by: RADIOLOGY

## 2022-08-15 ENCOUNTER — TELEPHONE (OUTPATIENT)
Dept: NEUROLOGY | Facility: CLINIC | Age: 57
End: 2022-08-15

## 2022-08-22 ENCOUNTER — OFFICE VISIT (OUTPATIENT)
Dept: NEUROLOGY | Facility: CLINIC | Age: 57
End: 2022-08-22

## 2022-08-22 VITALS
SYSTOLIC BLOOD PRESSURE: 116 MMHG | HEIGHT: 66 IN | OXYGEN SATURATION: 98 % | WEIGHT: 148 LBS | BODY MASS INDEX: 23.78 KG/M2 | DIASTOLIC BLOOD PRESSURE: 74 MMHG | HEART RATE: 105 BPM

## 2022-08-22 DIAGNOSIS — H93.19 TINNITUS, UNSPECIFIED LATERALITY: ICD-10-CM

## 2022-08-22 DIAGNOSIS — R43.1 PAROSMIA: ICD-10-CM

## 2022-08-22 DIAGNOSIS — G43.109 MIGRAINE WITH AURA AND WITHOUT STATUS MIGRAINOSUS, NOT INTRACTABLE: Primary | ICD-10-CM

## 2022-08-22 PROCEDURE — 99244 OFF/OP CNSLTJ NEW/EST MOD 40: CPT | Performed by: PHYSICIAN ASSISTANT

## 2022-08-22 RX ORDER — DULOXETIN HYDROCHLORIDE 30 MG/1
CAPSULE, DELAYED RELEASE ORAL
COMMUNITY
Start: 2022-05-27 | End: 2023-04-04

## 2022-08-22 RX ORDER — DULOXETIN HYDROCHLORIDE 60 MG/1
60 CAPSULE, DELAYED RELEASE ORAL DAILY
COMMUNITY
Start: 2022-05-27 | End: 2023-04-04

## 2022-08-24 DIAGNOSIS — H93.A9 PULSATILE TINNITUS: Primary | ICD-10-CM

## 2022-08-24 DIAGNOSIS — G44.89 HEADACHE SYNDROME: ICD-10-CM

## 2022-08-24 NOTE — PROGRESS NOTES
CC: Follow-up    HPI:  Saray Green is a  56 y.o. right-handed female with past medical history significant for probable connective tissue disorder with positive CAL, RNP follows with rheumatology, osteoarthritis, migraine headache, nonischemic cardiomyopathy,  MGUS that is low risk and can be followed with serial laboratory every 12 months.  She follows with oncology at [facility].  She is here for follow-up.  She was last seen in February and was having troubles with pulsatile tinnitus and abnormal smells.  She tells of sense of blowing or 'whoosh' from both ears, left worse than right.  It is constant there is no positional change.  It appears she has been having problems related to this for several years.  She had MRA in 2018 to rule out vascular etiology.  She follows with ENT and is said to have had an abnormal hearing test.  She continues to follow with them yearly.  Related to her smell disturbance she believes this began in the past year.  She had a COVID infection this time last year and lost both sense of taste and smell.  Sense of taste returned as well as smell but describes a malodorous smell like vinager or burning.  This is not episodic but constant.  We have also seen her in the past she initially followed with Dr. Barrios and then Dr. Parra.  Other neurologic symptoms have been dizziness/vertigo, mixing up words, and dysesthesias.  Of note she had an EMG in 2019 that was normal.  She does have history of viral myocarditis when she was around 33 years old. She believes this was d/t an occupational exposure.  She describes an EF down to 40% and following up with a cardiologist until 2013.    She is doing well.  She no longer has problems with sensory disturbance, numbness.  No further word searching problems.  She has h/o migraines but these are no longer problematic since menopause.  She takes diclofenac given to her by her rheumatologist.  There is a protuberance on the medial aspect of her right  "elbow, like a fatty pad that is new over the last several months.  It is not enlarging.  She has no pain or symptoms from it.    Social history:  Occupational therapist    Family history:  No neurologic d/o or not or rheumatologic disease.        Pain Scale:        ROS:  Review of Systems   Constitutional: Negative for activity change, appetite change and fatigue.   Musculoskeletal: Negative for arthralgias, back pain and gait problem.   Neurological: Negative for dizziness, tremors, seizures, syncope, facial asymmetry, speech difficulty, weakness, light-headedness, numbness and headaches.   Psychiatric/Behavioral: Positive for sleep disturbance. Negative for agitation, behavioral problems, confusion, decreased concentration, dysphoric mood, hallucinations, self-injury and suicidal ideas. The patient is not nervous/anxious and is not hyperactive.          Reviewed ROS conducted by MA and kinza      Physical Exam:  Vitals:    08/22/22 1306   Weight: 67.1 kg (148 lb)   Height: 167.6 cm (66\")     Orthostatic BP:    Body mass index is 23.89 kg/m².        General: pt well appearing, no distress  HEENT: Normocephalic, conjunctiva normal, external canals normal, no nasal discharge, moist mucous membranes  Neck: No lymphadenopathy, thyroid not enlarged, no JVD  CV: Regular rate and rhythm, no murmurs negative no bruits auscultated at neck, equal pulses  Pulmonary: Normal respiratory effort, clear to auscultation bilaterally  Extremities: no edema, bruising, or skin lesions  Pysch: good eye contact, cooperative, full affect, euthymic mood, good attention, good insight  Mental: alert, conversant, AOx3, provides history, recall.  Language fluent, names, repeats.  CN: CN II-XII intact, PERRL, EOMi, no gaze palsy or nystagmus, intact facial sensation, no facial assymetry, intact hearing, symmetric palate elevation, tongue midline, good SCM strength  Motor: no abnormal movements, no pronator drift, normal  bulk and tone, 5/5 " strength b/l UE & LE  Sensory: normal sensation to crude touch, temperature, very mild sensory loss to vibration bilateral toes  Reflexes: 2+ throughout, neg babinski  Coordination: no ataxia on finger-nose, heel-shin  Gait: normal gait, no ataxia, intact heel and toe walking        Results:      Lab Results   Component Value Date    GLUCOSE 95 03/16/2022    BUN 18 03/16/2022    CREATININE 0.99 03/16/2022    EGFRIFNONA 60 (L) 06/16/2021    EGFRIFAFRI 73 06/16/2021    BCR 18 03/16/2022    CO2 22 03/16/2022    CALCIUM 9.7 03/16/2022    PROTENTOTREF 7.1 03/16/2022    ALBUMIN 4.6 03/16/2022    LABIL2 1.8 03/16/2022    AST 15 03/16/2022    ALT 14 03/16/2022       Lab Results   Component Value Date    WBC 7.3 03/16/2022    HGB 14.6 03/16/2022    HCT 42.5 03/16/2022    MCV 90 03/16/2022     03/16/2022         .No results found for: RPR      Lab Results   Component Value Date    TSH 2.160 01/31/2019    E4AFOVF 115.2 01/31/2019    A6HEBVI 6.66 01/31/2019    THYROIDAB <3.00 07/17/2018         Lab Results   Component Value Date    DHQEXQCX19 255 04/26/2018         Lab Results   Component Value Date    FOLATE 5.2 03/16/2018         No results found for: HGBA1C      Lab Results   Component Value Date    GLUCOSE 95 03/16/2022    BUN 18 03/16/2022    CREATININE 0.99 03/16/2022    EGFRIFNONA 60 (L) 06/16/2021    EGFRIFAFRI 73 06/16/2021    BCR 18 03/16/2022    K 4.9 03/16/2022    CO2 22 03/16/2022    CALCIUM 9.7 03/16/2022    PROTENTOTREF 7.1 03/16/2022    ALBUMIN 4.6 03/16/2022    LABIL2 1.8 03/16/2022    AST 15 03/16/2022    ALT 14 03/16/2022         Diagnosis:  1. Pulsatile tinnitus  2. Parosmia  3. H/o migraine headache  4. Connective tissue d/o    Impression: 56-year-old female with history of hyperlipidemia, osteoarthritis, depression/anxiety, current headache, B12 deficiency, knee disease, connective tissue disorder who is here for follow-up related to problems of pulsatile tinnitus and malodorous smell.  These have  been ongoing for about a year or longer but now have become constant.  She had a unremarkable MRI series and 2018.  She sees ENT and had MRI brain of the internal acoustic canal which was unremarkable, anterior rhinoscopy was unrevealing.  She apparently has some hearing loss.  MRA head and neck  were recommended but I cannot find that this was ever done after search.  She did have carotid doppler so I am suspecting it was never done.  She has near constant symptoms now and I think it prudent to get more thorough evaluation of her cerebral vasculature.  She continues to follow with her ENT    Plan:    1. MRI head/neck ordered  2.  This has not proved to be a self-limited condition.  Her strategies for diversion do not help.  Think worthwhile to ensure she has no URI contributing symptoms.  She will begin her nasal spray.  I really do not know what else to offer but I suppose we consider Depakote, Topamax, verapamil which studies have shown to provide some benefit.  Could certainly help with her migraines even though they do not seem particularly bothersome presently  3.  F.u after MRA     I spent at least 30 minutes interviewing, examining, and counseling patient.  I independently reviewed documentation, laboratory and diagnostic findings, external documentation where applicable, and formulated treatment plan which was discussed with the patient.

## 2022-09-20 ENCOUNTER — HOSPITAL ENCOUNTER (OUTPATIENT)
Dept: MRI IMAGING | Facility: HOSPITAL | Age: 57
Discharge: HOME | End: 2022-09-20
Admitting: PHYSICIAN ASSISTANT

## 2022-09-20 ENCOUNTER — TELEPHONE (OUTPATIENT)
Dept: NEUROLOGY | Facility: CLINIC | Age: 57
End: 2022-09-20

## 2022-09-20 ENCOUNTER — OFFICE VISIT (OUTPATIENT)
Dept: INTERNAL MEDICINE | Facility: CLINIC | Age: 57
End: 2022-09-20

## 2022-09-20 VITALS
HEART RATE: 94 BPM | SYSTOLIC BLOOD PRESSURE: 110 MMHG | WEIGHT: 144.2 LBS | TEMPERATURE: 97.7 F | HEIGHT: 66 IN | BODY MASS INDEX: 23.18 KG/M2 | OXYGEN SATURATION: 98 % | DIASTOLIC BLOOD PRESSURE: 78 MMHG

## 2022-09-20 DIAGNOSIS — M15.9 OSTEOARTHRITIS INVOLVING MULTIPLE JOINTS ON BOTH SIDES OF BODY: Chronic | ICD-10-CM

## 2022-09-20 DIAGNOSIS — H93.A9 PULSATILE TINNITUS: ICD-10-CM

## 2022-09-20 DIAGNOSIS — E78.2 MIXED HYPERLIPIDEMIA: Primary | ICD-10-CM

## 2022-09-20 DIAGNOSIS — G44.89 HEADACHE SYNDROME: ICD-10-CM

## 2022-09-20 DIAGNOSIS — F42.2 MIXED OBSESSIONAL THOUGHTS AND ACTS: Chronic | ICD-10-CM

## 2022-09-20 PROBLEM — D47.2 MGUS (MONOCLONAL GAMMOPATHY OF UNKNOWN SIGNIFICANCE): Status: ACTIVE | Noted: 2022-08-29

## 2022-09-20 PROCEDURE — 70544 MR ANGIOGRAPHY HEAD W/O DYE: CPT

## 2022-09-20 PROCEDURE — A9577 INJ MULTIHANCE: HCPCS | Performed by: PHYSICIAN ASSISTANT

## 2022-09-20 PROCEDURE — 82565 ASSAY OF CREATININE: CPT

## 2022-09-20 PROCEDURE — 99214 OFFICE O/P EST MOD 30 MIN: CPT | Performed by: NURSE PRACTITIONER

## 2022-09-20 PROCEDURE — 70549 MR ANGIOGRAPH NECK W/O&W/DYE: CPT

## 2022-09-20 PROCEDURE — 0 GADOBENATE DIMEGLUMINE 529 MG/ML SOLUTION: Performed by: PHYSICIAN ASSISTANT

## 2022-09-20 RX ORDER — DICLOFENAC SODIUM 75 MG/1
75 TABLET, DELAYED RELEASE ORAL DAILY
Qty: 90 TABLET | Refills: 1
Start: 2022-09-20

## 2022-09-20 RX ADMIN — GADOBENATE DIMEGLUMINE 13 ML: 529 INJECTION, SOLUTION INTRAVENOUS at 22:34

## 2022-09-20 NOTE — PROGRESS NOTES
Chief Complaint  Hyperlipidemia (6 month follow up )     Subjective:      History of Present Illness {CC  Problem List  Visit  Diagnosis   Encounters  Notes  Medications  Labs  Result Review Imaging  Media :23}     Saray Green presents to [facility] for:      She chronically has nonischemic cardiomyopathy, MGUS, connective tissue disorder +CAL - followed by Rheum, migraines, OA    Since last visit: she saw neurology: tinnitus and phantom smells - plan for MRA this evening.      States no longer taking voltaren or lexapro.    States changed to cymbalta by rheumatology.   I have asked her to have Dr Tomlinson to send me progress notes.     Hyperlipidemia: continues statin.         She saw GI: hemorrhoid is better not going to get banding at this time.     Son: dealing with alcohol / depression.  He has gone to rehab.  Still needs counseling.    She is seeing counseling herself.          I have reviewed patient's medical history, any new submitted information provided by patient or medical assistant and updated medical record.      Objective:      Physical Exam  Vitals reviewed.   Constitutional:       Appearance: Normal appearance. She is well-developed.   HENT:      Head:      Comments: Wearing mask due to COVID   Neck:      Thyroid: No thyromegaly.   Cardiovascular:      Rate and Rhythm: Normal rate and regular rhythm.      Pulses: Normal pulses.      Heart sounds: Normal heart sounds.   Pulmonary:      Effort: Pulmonary effort is normal.      Breath sounds: Normal breath sounds.      Comments: E/U   Skin:     General: Skin is warm and dry.   Neurological:      Mental Status: She is alert and oriented to person, place, and time.   Psychiatric:         Mood and Affect: Mood normal.         Behavior: Behavior normal. Behavior is cooperative.         Thought Content: Thought content normal.         Judgment: Judgment normal.        Result Review  Data Reviewed:{ Labs   "Result Review  Imaging  Med Tab  Media :23}     The following data was reviewed by: Verona Engel III, NP-C on 09/20/2022  Common labs    Common Labs 3/16/22 3/16/22 3/16/22    1317 1317 1317   Glucose 95     BUN 18     Creatinine 0.99     Sodium 141     Potassium 4.9     Chloride 103     Calcium 9.7     Total Protein 7.1     Albumin 4.6     Total Bilirubin 0.5     Alkaline Phosphatase 91     AST (SGOT) 15     ALT (SGPT) 14     WBC   7.3   Hemoglobin   14.6   Hematocrit   42.5   Platelets   308   Total Cholesterol  236 (A)    Triglycerides  76    HDL Cholesterol  64    LDL Cholesterol   159 (A)    (A) Abnormal value                   Vital Signs:   /78 (BP Location: Left arm, Patient Position: Sitting, Cuff Size: Adult)   Pulse 94   Temp 97.7 °F (36.5 °C) (Temporal)   Ht 167.6 cm (66\")   Wt 65.4 kg (144 lb 3.2 oz)   SpO2 98%   BMI 23.27 kg/m²         Requested Prescriptions     Signed Prescriptions Disp Refills   • diclofenac (VOLTAREN) 75 MG EC tablet 90 tablet 1     Sig: Take 1 tablet by mouth Daily.       Routine medications provided by this office will also be refilled via pharmacy request.       Current Outpatient Medications:   •  atorvastatin (LIPITOR) 20 MG tablet, Take 1 tablet by mouth Daily., Disp: 90 tablet, Rfl: 1  •  diclofenac (VOLTAREN) 75 MG EC tablet, Take 1 tablet by mouth Daily., Disp: 90 tablet, Rfl: 1  •  DULoxetine (CYMBALTA) 30 MG capsule, TAKE 1 CAPSULE BY MOUTH EVERY DAY FOR 1 WEEK. THEN INCREASE TO 2 CAPSULES BY MOUTH EVERY DAY THEREAFTER, Disp: , Rfl:   •  DULoxetine (CYMBALTA) 60 MG capsule, Take 60 mg by mouth Daily., Disp: , Rfl:   •  vitamin D (ERGOCALCIFEROL) 1.25 MG (76627 UT) capsule capsule, TAKE 1 CAPSULE BY MOUTH EVERY 7 DAYS, Disp: 13 capsule, Rfl: 0     Assessment and Plan:      Assessment and Plan {CC Problem List  Visit Diagnosis  ROS  Review (Popup)  Health Maintenance  Quality  BestPractice  Medications  SmartSets  SnapShot " Encounters  Media :23}     Problem List Items Addressed This Visit        Cardiac and Vasculature    Hyperlipidemia - Primary (Chronic)    Current Assessment & Plan     Lipid abnormalities are improving with treatment.  Pharmacotherapy as ordered.  Lipids will be reassessed in 6 months.         Relevant Orders    Comprehensive Metabolic Panel    Lipid Panel With LDL / HDL Ratio       Mental Health    Depression/Anxiety (Chronic)    Current Assessment & Plan     Changed to cymbalta - feels it is helping.       If OCD worsens - can look at other options and/or get referral for behavioral health.             Musculoskeletal and Injuries    Osteoarthritis involving multiple joints on both sides of body (Chronic)    Current Assessment & Plan     Continues oral votaren.   Aware she needs to take with food.                Follow Up {Instructions Charge Capture  Follow-up Communications :23}     Return in about 6 months (around 3/20/2023) for Annual physical.      Patient was given instructions and counseling regarding her condition or for health maintenance advice. Please see specific information pulled into the AVS if appropriate.    Dragon disclaimer:   Much of this encounter note is an electronic transcription/translation of spoken language to printed text. The electronic translation of spoken language may permit erroneous, or at times, nonsensical words or phrases to be inadvertently transcribed; Although I have reviewed the note for such errors, some may still exist.     Additional Patient Counseling:       There are no Patient Instructions on file for this visit.

## 2022-09-20 NOTE — ASSESSMENT & PLAN NOTE
Changed to cymbalta - feels it is helping.       If OCD worsens - can look at other options and/or get referral for behavioral health.

## 2022-09-20 NOTE — TELEPHONE ENCOUNTER
I been working on overdue orders and realized that the [facility] had a PA for an MRI angio Head and Neck but the Head was only ordered because the order for the neck was entered wrong inflammatory bowel disease called the scheduling department and they now linked both orders and let the Patient know that she will have the test both done tonight at 9:45pm .  I also informed the PA Harriett gonzalez that every MRI angio has to be scheduled as W/WO. Azucena Hanley [facility]

## 2022-09-21 ENCOUNTER — TRANSCRIBE ORDERS (OUTPATIENT)
Dept: ADMINISTRATIVE | Facility: HOSPITAL | Age: 57
End: 2022-09-21

## 2022-09-21 ENCOUNTER — HOSPITAL ENCOUNTER (OUTPATIENT)
Dept: MRI IMAGING | Facility: HOSPITAL | Age: 57
Discharge: HOME | End: 2022-09-21

## 2022-09-21 ENCOUNTER — PATIENT MESSAGE (OUTPATIENT)
Dept: INTERNAL MEDICINE | Facility: CLINIC | Age: 57
End: 2022-09-21

## 2022-09-21 DIAGNOSIS — G44.89 HEADACHE SYNDROME: ICD-10-CM

## 2022-09-21 DIAGNOSIS — H93.A9 PULSATILE TINNITUS: Primary | ICD-10-CM

## 2022-09-21 DIAGNOSIS — H93.A9 PULSATILE TINNITUS: ICD-10-CM

## 2022-09-21 LAB
ALBUMIN SERPL-MCNC: 4.6 G/DL (ref 3.5–5.2)
ALBUMIN/GLOB SERPL: 2.3 G/DL
ALP SERPL-CCNC: 63 U/L (ref 39–117)
ALT SERPL-CCNC: 16 U/L (ref 1–33)
AST SERPL-CCNC: 15 U/L (ref 1–32)
BILIRUB SERPL-MCNC: 0.8 MG/DL (ref 0–1.2)
BUN SERPL-MCNC: 14 MG/DL (ref 6–20)
BUN/CREAT SERPL: 16.9 (ref 7–25)
CALCIUM SERPL-MCNC: 9.5 MG/DL (ref 8.6–10.5)
CHLORIDE SERPL-SCNC: 105 MMOL/L (ref 98–107)
CHOLEST SERPL-MCNC: 172 MG/DL (ref 0–200)
CO2 SERPL-SCNC: 27.5 MMOL/L (ref 22–29)
CREAT BLDA-MCNC: 0.9 MG/DL (ref 0.6–1.3)
CREAT SERPL-MCNC: 0.83 MG/DL (ref 0.57–1)
EGFRCR SERPLBLD CKD-EPI 2021: 81.8 ML/MIN/1.73
GLOBULIN SER CALC-MCNC: 2 GM/DL
GLUCOSE SERPL-MCNC: 96 MG/DL (ref 65–99)
HDLC SERPL-MCNC: 55 MG/DL (ref 40–60)
LDLC SERPL CALC-MCNC: 98 MG/DL (ref 0–100)
LDLC/HDLC SERPL: 1.74 {RATIO}
POTASSIUM SERPL-SCNC: 4.3 MMOL/L (ref 3.5–5.2)
PROT SERPL-MCNC: 6.6 G/DL (ref 6–8.5)
SODIUM SERPL-SCNC: 142 MMOL/L (ref 136–145)
TRIGL SERPL-MCNC: 107 MG/DL (ref 0–150)
VLDLC SERPL CALC-MCNC: 19 MG/DL (ref 5–40)

## 2022-09-22 ENCOUNTER — TELEPHONE (OUTPATIENT)
Dept: NEUROLOGY | Facility: CLINIC | Age: 57
End: 2022-09-22

## 2022-09-22 NOTE — TELEPHONE ENCOUNTER
Caller: ELIZABETH     Relationship: SELF    Best call back number: [phone]    What is the best time to reach you: ANYTIME ASAP    Who are you requesting to speak with (clinical staff, provider,  specific staff member): PEGGY MANNING    Do you know the name of the person who called: ABRBY DIOR    What was the call regarding: TEST RESULTS     Do you require a callback: YES

## 2022-09-22 NOTE — TELEPHONE ENCOUNTER
From: Saray Green  To: Verona Engel III, SAMSON-C  Sent: 9/21/2022 10:49 AM EDT  Subject: question about labs    HI Saeid,    Thanks for info on labs. Had a question about my Efgr being 81. I read 60-90 can be early kidney disease. Thoughts? Also, I had my MRA last night of the head and neck and they called me back to come in for more imaging later today but wouldn't tell me why or what they saw. Can you look into and let me know. I have our older son's wedding this weekend and with all going on with our younger son and can't deal with the uknown. Thanks for any help. Thx Saray Cadet

## 2022-09-26 NOTE — TELEPHONE ENCOUNTER
Patient was called and she got her normal result and was also instructed that if she needs anything else to call us back ,Azucena GUARDADO

## 2022-11-21 DIAGNOSIS — L74.9 SWEATING ABNORMALITY: Primary | ICD-10-CM

## 2022-11-22 ENCOUNTER — TELEPHONE (OUTPATIENT)
Dept: INTERNAL MEDICINE | Facility: CLINIC | Age: 57
End: 2022-11-22

## 2022-11-22 NOTE — TELEPHONE ENCOUNTER
----- Message from SNEHA Avina III sent at 11/21/2022  5:26 PM EST -----  Please call and schedule her a lab only appointment at her convenience.     ANUSHKA

## 2022-11-22 NOTE — TELEPHONE ENCOUNTER
----- Message from Saray Green sent at 11/21/2022  8:32 PM EST -----  Regarding: new issue  Contact: [phone]  Leana.  Armen so much

## 2022-11-28 ENCOUNTER — TELEPHONE (OUTPATIENT)
Dept: INTERNAL MEDICINE | Facility: CLINIC | Age: 57
End: 2022-11-28

## 2022-11-30 ENCOUNTER — CLINICAL SUPPORT (OUTPATIENT)
Dept: INTERNAL MEDICINE | Facility: CLINIC | Age: 57
End: 2022-11-30
Payer: COMMERCIAL

## 2022-12-01 ENCOUNTER — TELEPHONE (OUTPATIENT)
Dept: INTERNAL MEDICINE | Facility: CLINIC | Age: 57
End: 2022-12-01

## 2022-12-01 NOTE — TELEPHONE ENCOUNTER
----- Message from Saray Cadet Peter sent at 12/1/2022  1:33 AM EST -----  Regarding: labs  Contact: [phone]  PERLA Breaux,    I saw my labs look good.   I saw you had sent a message but when I went to open it, it  disappeared.  Could you resend your message?  I appreciate you checking my thyroid and liver.  I saw Dr. Tomlinson this afternoon  for my 6 month check up and was telling her about it and she thought if the labs you did looked ok then it is probably all be related to post menopausal hormone changes.  Thanks again for all you do for me.  Take Care and have a nice Holiday season.  Saray Cadet

## 2022-12-09 ENCOUNTER — APPOINTMENT (OUTPATIENT)
Dept: WOMENS IMAGING | Facility: HOSPITAL | Age: 57
End: 2022-12-09

## 2022-12-09 PROCEDURE — 76642 ULTRASOUND BREAST LIMITED: CPT | Performed by: RADIOLOGY

## 2022-12-09 PROCEDURE — 77066 DX MAMMO INCL CAD BI: CPT | Performed by: RADIOLOGY

## 2022-12-09 PROCEDURE — G0279 TOMOSYNTHESIS, MAMMO: HCPCS | Performed by: RADIOLOGY

## 2022-12-09 PROCEDURE — 77062 BREAST TOMOSYNTHESIS BI: CPT | Performed by: RADIOLOGY

## 2023-04-04 ENCOUNTER — OFFICE VISIT (OUTPATIENT)
Dept: INTERNAL MEDICINE | Facility: CLINIC | Age: 58
End: 2023-04-04
Payer: COMMERCIAL

## 2023-04-04 VITALS
WEIGHT: 154.4 LBS | OXYGEN SATURATION: 97 % | HEART RATE: 88 BPM | DIASTOLIC BLOOD PRESSURE: 84 MMHG | BODY MASS INDEX: 24.81 KG/M2 | SYSTOLIC BLOOD PRESSURE: 130 MMHG | HEIGHT: 66 IN

## 2023-04-04 DIAGNOSIS — M15.9 OSTEOARTHRITIS INVOLVING MULTIPLE JOINTS ON BOTH SIDES OF BODY: Chronic | ICD-10-CM

## 2023-04-04 DIAGNOSIS — G47.09 OTHER INSOMNIA: ICD-10-CM

## 2023-04-04 DIAGNOSIS — D47.2 MGUS (MONOCLONAL GAMMOPATHY OF UNKNOWN SIGNIFICANCE): ICD-10-CM

## 2023-04-04 DIAGNOSIS — F42.2 MIXED OBSESSIONAL THOUGHTS AND ACTS: Chronic | ICD-10-CM

## 2023-04-04 DIAGNOSIS — E78.2 MIXED HYPERLIPIDEMIA: Chronic | ICD-10-CM

## 2023-04-04 DIAGNOSIS — Z00.00 ANNUAL PHYSICAL EXAM: Primary | ICD-10-CM

## 2023-04-04 DIAGNOSIS — K57.90 DIVERTICULOSIS: Chronic | ICD-10-CM

## 2023-04-04 DIAGNOSIS — E55.9 VITAMIN D DEFICIENCY: Chronic | ICD-10-CM

## 2023-04-04 LAB
25(OH)D3+25(OH)D2 SERPL-MCNC: 49.4 NG/ML (ref 30–100)
ALBUMIN SERPL-MCNC: 4.4 G/DL (ref 3.5–5.2)
ALBUMIN/GLOB SERPL: 1.9 G/DL
ALP SERPL-CCNC: 93 U/L (ref 39–117)
ALT SERPL-CCNC: 17 U/L (ref 1–33)
AST SERPL-CCNC: 18 U/L (ref 1–32)
BILIRUB SERPL-MCNC: 0.4 MG/DL (ref 0–1.2)
BUN SERPL-MCNC: 24 MG/DL (ref 6–20)
BUN/CREAT SERPL: 26.7 (ref 7–25)
CALCIUM SERPL-MCNC: 9.9 MG/DL (ref 8.6–10.5)
CHLORIDE SERPL-SCNC: 104 MMOL/L (ref 98–107)
CHOLEST SERPL-MCNC: 231 MG/DL (ref 0–200)
CO2 SERPL-SCNC: 27.7 MMOL/L (ref 22–29)
CREAT SERPL-MCNC: 0.9 MG/DL (ref 0.57–1)
EGFRCR SERPLBLD CKD-EPI 2021: 74.3 ML/MIN/1.73
ERYTHROCYTE [DISTWIDTH] IN BLOOD BY AUTOMATED COUNT: 12.2 % (ref 12.3–15.4)
GLOBULIN SER CALC-MCNC: 2.3 GM/DL
GLUCOSE SERPL-MCNC: 104 MG/DL (ref 65–99)
HCT VFR BLD AUTO: 40.3 % (ref 34–46.6)
HDLC SERPL-MCNC: 66 MG/DL (ref 40–60)
HGB BLD-MCNC: 14.4 G/DL (ref 12–15.9)
LDLC SERPL CALC-MCNC: 152 MG/DL (ref 0–100)
LDLC/HDLC SERPL: 2.28 {RATIO}
MCH RBC QN AUTO: 31.6 PG (ref 26.6–33)
MCHC RBC AUTO-ENTMCNC: 35.7 G/DL (ref 31.5–35.7)
MCV RBC AUTO: 88.6 FL (ref 79–97)
PLATELET # BLD AUTO: 327 10*3/MM3 (ref 140–450)
POTASSIUM SERPL-SCNC: 4.8 MMOL/L (ref 3.5–5.2)
PROT SERPL-MCNC: 6.7 G/DL (ref 6–8.5)
RBC # BLD AUTO: 4.55 10*6/MM3 (ref 3.77–5.28)
SODIUM SERPL-SCNC: 140 MMOL/L (ref 136–145)
TRIGL SERPL-MCNC: 74 MG/DL (ref 0–150)
VLDLC SERPL CALC-MCNC: 13 MG/DL (ref 5–40)
WBC # BLD AUTO: 5.39 10*3/MM3 (ref 3.4–10.8)

## 2023-04-04 PROCEDURE — 99396 PREV VISIT EST AGE 40-64: CPT | Performed by: NURSE PRACTITIONER

## 2023-04-04 RX ORDER — FLUOXETINE HYDROCHLORIDE 20 MG/1
20 CAPSULE ORAL DAILY
COMMUNITY
Start: 2022-12-19

## 2023-04-04 RX ORDER — HYDROXYZINE HYDROCHLORIDE 25 MG/1
25 TABLET, FILM COATED ORAL NIGHTLY PRN
Qty: 30 TABLET | Refills: 2 | Status: SHIPPED | OUTPATIENT
Start: 2023-04-04

## 2023-04-04 NOTE — PROGRESS NOTES
Chief Complaint  Annual Exam     Subjective:      History of Present Illness {CC  Problem List  Visit  Diagnosis   Encounters  Notes  Medications  Labs  Result Review Imaging  Media :23}     Saray Green presents to [facility] for:  Annual exam excluding GYN     She chronically has nonischemic cardiomyopathy, MGUS, connective tissue disorder +CAL - followed by Rheum, migraines, OA    MGUS followed by Jhonny Nova  She has seen Dr Tomlinson Rheumatology.      2022: MRA neck and head unremarkable per report.   MRI Additional Images (2022 5:47 PM)      Vertigo: states no source identified with ENT  Not pursing further workup       Saray Cadet is here for coordination of medical care, to discuss health maintenance, disease prevention as well as to followup on medical problems.     Patient Care Team:  Verona Engel III, NP-C as PCP - General (Family Medicine)  Alicia Palomino MD as Consulting Physician (Obstetrics)  Juan C Parra MD as Consulting Physician (Neurology)  Jhonny Nova MD as Consulting Physician (Hematology and Oncology)  Delvis Schmitt MD as Consulting Physician (Otolaryngology)  Melva Chowdhury MD as Consulting Physician (Dermatology)  Daphne Tomlinson MD as Consulting Physician (Rheumatology)     Activity level is moderate.     Weight trend is     Wt Readings from Last 4 Encounters:   23 70 kg (154 lb 6.4 oz)   22 65.4 kg (144 lb 3.2 oz)   22 67.1 kg (148 lb)   22 70.5 kg (155 lb 6.4 oz)         Health Maintenance Female:    • GYN: Alicia Palomino   • Last gynecology appointment: 3/28/2023  • Cystocele with incomplete uterovaginal prolapse: referred to uro/gyn   • Advised routine self-breast exams monthly.  • Last mammogram Dec 2022: every 6 months            Colon cancer screen:   She has no change in bowel habits.   Patient's last colonoscopy was 2021.   She was advised to repeat  in 5 years.  Margareth   COLONOSCOPY - SCAN - COLONOSCOPY, [facility], 7/20/2021 (07/20/2021)    Vaccines: Declines shingles vaccine      Last eye exam: UTD     Advised regular sunscreen.        I have reviewed patient's medical history, any new submitted information provided by patient or medical assistant and updated medical record.      Objective:      Physical Exam  Vitals reviewed.   Constitutional:       Appearance: Normal appearance. She is well-developed.   HENT:      Head:      Comments: Wearing mask due to COVID   Neck:      Thyroid: No thyromegaly.   Cardiovascular:      Rate and Rhythm: Normal rate and regular rhythm.      Pulses: Normal pulses.      Heart sounds: Normal heart sounds.   Pulmonary:      Effort: Pulmonary effort is normal.      Breath sounds: Normal breath sounds.      Comments: E/U   Abdominal:      General: Bowel sounds are normal.      Palpations: Abdomen is soft.   Genitourinary:     Comments: Deferred GYN  Musculoskeletal:         General: Normal range of motion.      Cervical back: Normal range of motion and neck supple.      Right lower leg: No edema.      Left lower leg: No edema.   Lymphadenopathy:      Cervical: No cervical adenopathy.   Skin:     General: Skin is warm and dry.      Capillary Refill: Capillary refill takes 2 to 3 seconds.   Neurological:      Mental Status: She is alert and oriented to person, place, and time.   Psychiatric:         Mood and Affect: Mood normal.         Behavior: Behavior normal. Behavior is cooperative.         Thought Content: Thought content normal.         Judgment: Judgment normal.        Result Review  Data Reviewed:{ Labs  Result Review  Imaging  Med Tab  Media :23}     The following data was reviewed by: Verona Engel III, NP-C on 04/04/2023  Common labs    Common Labs 8/19/22 9/20/22 9/20/22 9/20/22 11/30/22     1112 1112 2210    Glucose  96      BUN  14      Creatinine  0.83  0.90    Sodium  142      Potassium  4.3    "   Chloride  105      Calcium  9.5      Total Protein  6.6   6.3   Albumin  4.60   4.20   Total Bilirubin  0.8   0.3   Alkaline Phosphatase  63   88   AST (SGOT)  15   15   ALT (SGPT)  16   10   WBC 6.14       Hemoglobin 14.6       Hematocrit 43.3       Platelets 315       Total Cholesterol   172     Triglycerides   107     HDL Cholesterol   55     LDL Cholesterol    98        Comments are available for some flowsheets but are not being displayed.                  Vital Signs:   /84 (BP Location: Left arm, Patient Position: Sitting, Cuff Size: Adult)   Pulse 88   Ht 167.6 cm (66\")   Wt 70 kg (154 lb 6.4 oz)   SpO2 97%   BMI 24.92 kg/m²         Requested Prescriptions     Signed Prescriptions Disp Refills   • hydrOXYzine (ATARAX) 25 MG tablet 30 tablet 2     Sig: Take 1 tablet by mouth At Night As Needed for Anxiety (insomnia).       Routine medications provided by this office will also be refilled via pharmacy request.       Current Outpatient Medications:   •  atorvastatin (LIPITOR) 20 MG tablet, Take 1 tablet by mouth Daily., Disp: 90 tablet, Rfl: 1  •  diclofenac (VOLTAREN) 75 MG EC tablet, Take 1 tablet by mouth Daily., Disp: 90 tablet, Rfl: 1  •  FLUoxetine (PROzac) 20 MG capsule, Take 1 capsule by mouth Daily., Disp: , Rfl:   •  vitamin D (ERGOCALCIFEROL) 1.25 MG (86724 UT) capsule capsule, TAKE 1 CAPSULE BY MOUTH EVERY 7 DAYS, Disp: 13 capsule, Rfl: 0  •  hydrOXYzine (ATARAX) 25 MG tablet, Take 1 tablet by mouth At Night As Needed for Anxiety (insomnia)., Disp: 30 tablet, Rfl: 2     Assessment and Plan:      Assessment and Plan {CC Problem List  Visit Diagnosis  ROS  Review (Popup)  Health Maintenance  Quality  BestPractice  Medications  SmartSets  SnapShot Encounters  Media :23}     Problem List Items Addressed This Visit        Cardiac and Vasculature    Hyperlipidemia (Chronic)    Current Assessment & Plan     Lipid abnormalities are improving with treatment.  Pharmacotherapy as " ordered.  Lipids will be reassessed in 6 months.         Relevant Orders    Comprehensive Metabolic Panel    Lipid Panel With LDL / HDL Ratio       Endocrine and Metabolic    Vitamin D deficiency (Chronic)    Relevant Orders    Vitamin D 25 hydroxy       Gastrointestinal Abdominal     Diverticulosis (Chronic)    Overview     Advised life long high fiber diet             Hematology and Neoplasia    MGUS (monoclonal gammopathy of unknown significance)       Mental Health    Depression/Anxiety (Chronic)    Overview     Prior treatment:  cymbalta         Current Assessment & Plan     Continue prozac             Musculoskeletal and Injuries    Osteoarthritis involving multiple joints on both sides of body (Chronic)    Current Assessment & Plan     Continues voltaren             Sleep    Other insomnia    Current Assessment & Plan     Will trial atarax     If no improvement: can try trazodone         Other Visit Diagnoses     Annual physical exam    -  Primary    Relevant Orders    Comprehensive Metabolic Panel    Lipid Panel With LDL / HDL Ratio    CBC (No Diff)          Follow Up {Instructions Charge Capture  Follow-up Communications :23}     Return in about 6 months (around 10/4/2023).      Patient was given instructions and counseling regarding her condition or for health maintenance advice. Please see specific information pulled into the AVS if appropriate.    Dragon disclaimer:   Much of this encounter note is an electronic transcription/translation of spoken language to printed text. The electronic translation of spoken language may permit erroneous, or at times, nonsensical words or phrases to be inadvertently transcribed; Although I have reviewed the note for such errors, some may still exist.     Additional Patient Counseling:       There are no Patient Instructions on file for this visit.

## 2023-04-06 DIAGNOSIS — E78.2 MIXED HYPERLIPIDEMIA: Primary | ICD-10-CM
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
## 2023-06-15 RX ORDER — DOXYCYCLINE HYCLATE 100 MG/1
200 CAPSULE ORAL ONCE
Qty: 2 CAPSULE | Refills: 0 | Status: SHIPPED | OUTPATIENT
Start: 2023-06-15 | End: 2023-06-15

## 2023-10-06 ENCOUNTER — OFFICE VISIT (OUTPATIENT)
Dept: INTERNAL MEDICINE | Facility: CLINIC | Age: 58
End: 2023-10-06
Payer: COMMERCIAL

## 2023-10-06 VITALS
OXYGEN SATURATION: 99 % | BODY MASS INDEX: 24.75 KG/M2 | HEART RATE: 88 BPM | SYSTOLIC BLOOD PRESSURE: 118 MMHG | HEIGHT: 66 IN | WEIGHT: 154 LBS | DIASTOLIC BLOOD PRESSURE: 84 MMHG

## 2023-10-06 DIAGNOSIS — E55.9 VITAMIN D DEFICIENCY: Chronic | ICD-10-CM

## 2023-10-06 DIAGNOSIS — Z23 NEEDS FLU SHOT: ICD-10-CM

## 2023-10-06 DIAGNOSIS — D47.2 MGUS (MONOCLONAL GAMMOPATHY OF UNKNOWN SIGNIFICANCE): ICD-10-CM

## 2023-10-06 DIAGNOSIS — E78.2 MIXED HYPERLIPIDEMIA: Primary | Chronic | ICD-10-CM

## 2023-10-06 LAB
BUN SERPL-MCNC: 23 MG/DL (ref 6–20)
BUN/CREAT SERPL: 24.7 (ref 7–25)
CALCIUM SERPL-MCNC: 9.7 MG/DL (ref 8.6–10.5)
CHLORIDE SERPL-SCNC: 106 MMOL/L (ref 98–107)
CHOLEST SERPL-MCNC: 194 MG/DL (ref 0–200)
CO2 SERPL-SCNC: 25.9 MMOL/L (ref 22–29)
CREAT SERPL-MCNC: 0.93 MG/DL (ref 0.57–1)
EGFRCR SERPLBLD CKD-EPI 2021: 70.9 ML/MIN/1.73
ERYTHROCYTE [DISTWIDTH] IN BLOOD BY AUTOMATED COUNT: 12.6 % (ref 12.3–15.4)
GLUCOSE SERPL-MCNC: 99 MG/DL (ref 65–99)
HCT VFR BLD AUTO: 41.3 % (ref 34–46.6)
HDLC SERPL-MCNC: 52 MG/DL (ref 40–60)
HGB BLD-MCNC: 14.2 G/DL (ref 12–15.9)
LDLC SERPL CALC-MCNC: 120 MG/DL (ref 0–100)
LDLC/HDLC SERPL: 2.26 {RATIO}
MCH RBC QN AUTO: 31.2 PG (ref 26.6–33)
MCHC RBC AUTO-ENTMCNC: 34.4 G/DL (ref 31.5–35.7)
MCV RBC AUTO: 90.8 FL (ref 79–97)
PLATELET # BLD AUTO: 288 10*3/MM3 (ref 140–450)
POTASSIUM SERPL-SCNC: 4.7 MMOL/L (ref 3.5–5.2)
RBC # BLD AUTO: 4.55 10*6/MM3 (ref 3.77–5.28)
SODIUM SERPL-SCNC: 141 MMOL/L (ref 136–145)
TRIGL SERPL-MCNC: 122 MG/DL (ref 0–150)
VLDLC SERPL CALC-MCNC: 22 MG/DL (ref 5–40)
WBC # BLD AUTO: 5.98 10*3/MM3 (ref 3.4–10.8)

## 2023-10-06 NOTE — ASSESSMENT & PLAN NOTE
Lipid abnormalities are improving with treatment.  Pharmacotherapy as ordered.  Lipids will be reassessed in 6 months.    Recheck today.

## 2023-10-06 NOTE — PATIENT INSTRUCTIONS
September is Prostate Cancer Awareness Month   Prostate cancer is the second most common cancer among men.   Screening generally starts at 55 years of age but if you have increased risk factors:   Have at least one first-degree relative (such as your father or brother) who has had prostate cancer  Have at least two extended family members who have had prostate cancer   Are -American, an ethnicity that has a higher risk of developing more aggressive cancers  Your screening could start at a younger age. Please ensure you discuss your risk factors.     https://www.cdc.gov/cancer/prostate/basic_info/index.htm          October is Breast Cancer Awareness Month  Each year more than 245,000 women get breast cancer in the [place].  Each year approximately 2,650 men are diagnosed with breast cancer.     Monitor your breast for changes  Any change in the size or the shape of the breast  Pain in any area of the breast  Nipple discharge other than breast milk (including blood)  A new lump in the breast or underarm  *Continue regular scheduled mammograms    Diet:    Eat vegetables, fruits, whole grain, low-fat dairy, poultry, fish, beans, nontropical vegetable oils, and nuts, but low amounts of red meat (i.e., Mediterranean-style diet, DASH [Dietary Approaches to Stop Hypertension] diet).  Limit sugary drinks and sweets.  Limit saturated and trans fat to 5% to 6% of calories.  Limit sodium intake to 2,400 mg daily (about one teaspoon table salt [kosher/sea salt have less sodium per teaspoon]).  Fad diets will come and go.  Studies show that the most effective diet is one that you can continue long term.     Weight loss / Calorie Counting Apps:    Lose It!   [employer] Pal   Works great when you try it with a partner/ friend    Exercise:   Engage in moderate-to-vigorous aerobic activity for at least 40 minutes (on average) three to four times each week.    Wearables:   Activity tracker   Step tracker: getting 7,500  steps daily can cut your cardiac risks by 44%     Bone Health:   Https://www.nof.org/patients/treatment/nutrition/  Routine weight bearing exercise    Vaccines:   Flu vaccine every fall  https://www.vaccinateyourfamily.org/    COVID booster recommended: newest booster is now available at local pharmacies.   You can take the booster 2 months after previous booster.    If you had recent COVID infection: in general, you may take the booster when feeling well and up to 3 months after infection.   COVID resources: https://govstatus.[employer]/nyyxlpf70    Note: you may take COVID booster along with flu vaccine unless contraindicated.       Mental Health:

## 2023-10-06 NOTE — PROGRESS NOTES
Chief Complaint  Hyperlipidemia     Subjective:      History of Present Illness {CC  Problem List  Visit  Diagnosis   Encounters  Notes  Medications  Labs  Result Review Imaging  Media :23}     Saray Green presents to [facility] for:      MGUS  Followed by Dr Pérez:   LV: 8/29/23: note reviewed: no change: continue surveillance       Hyperlipidemia:   Last visit: LDL up.  States she was taking medication routinely and fasting.  Wished to recheck before adjusting dose.   Lab Results   Component Value Date    CHLPL 231 (H) 04/04/2023    TRIG 74 04/04/2023    HDL 66 (H) 04/04/2023     (H) 04/04/2023        Vitamin d deficiency: continues vitamin d supplement     Last mammogram: 6/9/22  Scheduled for December Monday was GYN with Pap    Will get flu vaccine today.   Declines further covid vaccines at this time.     I have reviewed patient's medical history, any new submitted information provided by patient or medical assistant and updated medical record.      Objective:      Physical Exam  Vitals reviewed.   Constitutional:       Appearance: Normal appearance. She is well-developed.   Neck:      Thyroid: No thyromegaly.   Cardiovascular:      Rate and Rhythm: Normal rate and regular rhythm.      Pulses: Normal pulses.      Heart sounds: Normal heart sounds.   Pulmonary:      Effort: Pulmonary effort is normal.      Breath sounds: Normal breath sounds.      Comments: E/U   Neurological:      Mental Status: She is alert and oriented to person, place, and time.   Psychiatric:         Behavior: Behavior is cooperative.      Result Review  Data Reviewed:{ Labs  Result Review  Imaging  Med Tab  Media :23}     The following data was reviewed by: Verona Engel III, NP-KIM on 10/06/2023  Common labs          11/30/2022    10:54 4/4/2023    11:16 8/22/2023    10:10   Common Labs   Glucose  104     BUN  24     Creatinine  0.90     Sodium  140    "  Potassium  4.8     Chloride  104     Calcium  9.9     Total Protein 6.3  6.7     Albumin 4.20  4.4     Total Bilirubin 0.3  0.4     Alkaline Phosphatase 88  93     AST (SGOT) 15  18     ALT (SGPT) 10  17     WBC  5.39  6.09       Hemoglobin  14.4  14.5       Hematocrit  40.3  42.5       Platelets  327  272       Total Cholesterol  231     Triglycerides  74     HDL Cholesterol  66     LDL Cholesterol   152        Details          This result is from an external source.                    Vital Signs:   /84 (BP Location: Left arm, Patient Position: Sitting, Cuff Size: Adult)   Pulse 88   Ht 167.6 cm (65.98\")   Wt 69.9 kg (154 lb)   SpO2 99%   BMI 24.87 kg/m²         Requested Prescriptions      No prescriptions requested or ordered in this encounter       Routine medications provided by this office will also be refilled via pharmacy request.       Current Outpatient Medications:     atorvastatin (LIPITOR) 20 MG tablet, TAKE 1 TABLET BY MOUTH DAILY, Disp: 90 tablet, Rfl: 1    diclofenac (VOLTAREN) 75 MG EC tablet, Take 1 tablet by mouth Daily., Disp: 90 tablet, Rfl: 1    FLUoxetine (PROzac) 20 MG capsule, Take 1 capsule by mouth Daily., Disp: , Rfl:     hydrOXYzine (ATARAX) 25 MG tablet, Take 1 tablet by mouth At Night As Needed for Anxiety (insomnia)., Disp: 30 tablet, Rfl: 2    vitamin D (ERGOCALCIFEROL) 1.25 MG (01914 UT) capsule capsule, TAKE 1 CAPSULE BY MOUTH EVERY 7 DAYS, Disp: 13 capsule, Rfl: 0     Assessment and Plan:      Assessment and Plan {CC Problem List  Visit Diagnosis  ROS  Review (Popup)  Health Maintenance  Quality  BestPractice  Medications  SmartSets  SnapShot Encounters  Media :23}     Problem List Items Addressed This Visit          Cardiac and Vasculature    Hyperlipidemia - Primary (Chronic)    Current Assessment & Plan     Lipid abnormalities are improving with treatment.  Pharmacotherapy as ordered.  Lipids will be reassessed in 6 months.    Recheck today.          " Relevant Orders    Lipid Panel With LDL / HDL Ratio    Basic Metabolic Panel    CBC (No Diff)       Endocrine and Metabolic    Vitamin D deficiency (Chronic)    Current Assessment & Plan     Continue current dose.             Hematology and Neoplasia    MGUS (monoclonal gammopathy of unknown significance) (Chronic)    Overview     Followed by Dr Pérez           Other Visit Diagnoses       Needs flu shot        Relevant Orders    Fluzone >6 Months (4097-5332) (Completed)            Follow Up {Instructions Charge Capture  Follow-up Communications :23}     Return in about 1 year (around 10/6/2024) for Annual physical.      Patient was given instructions and counseling regarding her condition or for health maintenance advice. Please see specific information pulled into the AVS if appropriate.    Dragon disclaimer:   Much of this encounter note is an electronic transcription/translation of spoken language to printed text. The electronic translation of spoken language may permit erroneous, or at times, nonsensical words or phrases to be inadvertently transcribed; Although I have reviewed the note for such errors, some may still exist.     Additional Patient Counseling:       Patient Instructions       September is Prostate Cancer Awareness Month   Prostate cancer is the second most common cancer among men.   Screening generally starts at 55 years of age but if you have increased risk factors:   Have at least one first-degree relative (such as your father or brother) who has had prostate cancer  Have at least two extended family members who have had prostate cancer   Are -American, an ethnicity that has a higher risk of developing more aggressive cancers  Your screening could start at a younger age. Please ensure you discuss your risk factors.     https://www.cdc.gov/cancer/prostate/basic_info/index.htm          October is Breast Cancer Awareness Month  Each year more than 245,000 women get breast cancer in the  [place].  Each year approximately 2,650 men are diagnosed with breast cancer.     Monitor your breast for changes  Any change in the size or the shape of the breast  Pain in any area of the breast  Nipple discharge other than breast milk (including blood)  A new lump in the breast or underarm  *Continue regular scheduled mammograms    Diet:    Eat vegetables, fruits, whole grain, low-fat dairy, poultry, fish, beans, nontropical vegetable oils, and nuts, but low amounts of red meat (i.e., Mediterranean-style diet, DASH [Dietary Approaches to Stop Hypertension] diet).  Limit sugary drinks and sweets.  Limit saturated and trans fat to 5% to 6% of calories.  Limit sodium intake to 2,400 mg daily (about one teaspoon table salt [kosher/sea salt have less sodium per teaspoon]).  Fad diets will come and go.  Studies show that the most effective diet is one that you can continue long term.     Weight loss / Calorie Counting Apps:    Lose It!   [employer] Pal   Works great when you try it with a partner/ friend    Exercise:   Engage in moderate-to-vigorous aerobic activity for at least 40 minutes (on average) three to four times each week.    Wearables:   Activity tracker   Step tracker: getting 7,500 steps daily can cut your cardiac risks by 44%     Bone Health:   Https://www.nof.org/patients/treatment/nutrition/  Routine weight bearing exercise    Vaccines:   Flu vaccine every fall  https://www.vaccinateyourfamily.org/    COVID booster recommended: newest booster is now available at local pharmacies.   You can take the booster 2 months after previous booster.    If you had recent COVID infection: in general, you may take the booster when feeling well and up to 3 months after infection.   COVID resources: https://govstatus.[employer]/lanksmv44    Note: you may take COVID booster along with flu vaccine unless contraindicated.       Mental Health:

## 2023-10-30 RX ORDER — HYDROXYZINE HYDROCHLORIDE 25 MG/1
25 TABLET, FILM COATED ORAL NIGHTLY PRN
Qty: 30 TABLET | Refills: 2 | Status: SHIPPED | OUTPATIENT
Start: 2023-10-30

## 2023-10-30 NOTE — TELEPHONE ENCOUNTER
Rx Refill Note  Requested Prescriptions     Pending Prescriptions Disp Refills    hydrOXYzine (ATARAX) 25 MG tablet 30 tablet 2     Sig: Take 1 tablet by mouth At Night As Needed for Anxiety (insomnia).      Last office visit with prescribing clinician: 10/6/2023   Last telemedicine visit with prescribing clinician: Visit date not found   Next office visit with prescribing clinician: 10/8/2024         Viky Lanza MA  10/30/23, 13:44 EDT

## 2023-12-20 DIAGNOSIS — E55.9 VITAMIN D DEFICIENCY: Chronic | ICD-10-CM

## 2023-12-20 DIAGNOSIS — Z00.00 ANNUAL PHYSICAL EXAM: ICD-10-CM

## 2023-12-20 RX ORDER — MULTIVIT-MIN/IRON/FOLIC ACID/K 18-600-40
2000 CAPSULE ORAL DAILY
Qty: 90 CAPSULE | Refills: 3 | Status: SHIPPED | OUTPATIENT
Start: 2023-12-20

## 2024-07-31 ENCOUNTER — TRANSCRIBE ORDERS (OUTPATIENT)
Dept: ADMINISTRATIVE | Facility: HOSPITAL | Age: 59
End: 2024-07-31
Payer: COMMERCIAL

## 2024-07-31 DIAGNOSIS — M79.671 RIGHT FOOT PAIN: Primary | ICD-10-CM

## 2024-08-22 ENCOUNTER — HOSPITAL ENCOUNTER (OUTPATIENT)
Dept: GENERAL RADIOLOGY | Facility: HOSPITAL | Age: 59
Discharge: HOME | End: 2024-08-22
Payer: COMMERCIAL

## 2024-08-22 DIAGNOSIS — M79.671 RIGHT FOOT PAIN: ICD-10-CM

## 2024-08-22 PROCEDURE — 25010000002 BUPIVACAINE (PF) 0.25 % SOLUTION: Performed by: ORTHOPAEDIC SURGERY

## 2024-08-22 PROCEDURE — 25510000001 IOPAMIDOL 61 % SOLUTION: Performed by: ORTHOPAEDIC SURGERY

## 2024-08-22 PROCEDURE — 25010000002 LIDOCAINE 1 % SOLUTION: Performed by: ORTHOPAEDIC SURGERY

## 2024-08-22 PROCEDURE — 25010000002 METHYLPREDNISOLONE PER 40 MG: Performed by: ORTHOPAEDIC SURGERY

## 2024-08-22 PROCEDURE — 77002 NEEDLE LOCALIZATION BY XRAY: CPT

## 2024-08-22 RX ORDER — LIDOCAINE HYDROCHLORIDE 10 MG/ML
10 INJECTION, SOLUTION INFILTRATION; PERINEURAL ONCE
Status: COMPLETED | OUTPATIENT
Start: 2024-08-22 | End: 2024-08-22

## 2024-08-22 RX ORDER — BUPIVACAINE HYDROCHLORIDE 2.5 MG/ML
10 INJECTION, SOLUTION EPIDURAL; INFILTRATION; INTRACAUDAL ONCE
Status: COMPLETED | OUTPATIENT
Start: 2024-08-22 | End: 2024-08-22

## 2024-08-22 RX ORDER — METHYLPREDNISOLONE SODIUM SUCCINATE 40 MG/ML
40 INJECTION, POWDER, LYOPHILIZED, FOR SOLUTION INTRAMUSCULAR; INTRAVENOUS
Status: COMPLETED | OUTPATIENT
Start: 2024-08-22 | End: 2024-08-22

## 2024-08-22 RX ADMIN — METHYLPREDNISOLONE SODIUM SUCCINATE 40 MG: 40 INJECTION, POWDER, FOR SOLUTION INTRAMUSCULAR; INTRAVENOUS at 10:04

## 2024-08-22 RX ADMIN — BUPIVACAINE HYDROCHLORIDE 2 ML: 2.5 INJECTION, SOLUTION EPIDURAL; INFILTRATION; INTRACAUDAL; PERINEURAL at 10:04

## 2024-08-22 RX ADMIN — IOPAMIDOL 0.5 ML: 612 INJECTION, SOLUTION INTRAVENOUS at 10:04

## 2024-08-22 RX ADMIN — LIDOCAINE HYDROCHLORIDE 1 ML: 10 INJECTION, SOLUTION INFILTRATION; PERINEURAL at 10:04

## 2024-10-08 ENCOUNTER — OFFICE VISIT (OUTPATIENT)
Dept: INTERNAL MEDICINE | Facility: CLINIC | Age: 59
End: 2024-10-08
Payer: COMMERCIAL

## 2024-10-08 VITALS
HEART RATE: 92 BPM | SYSTOLIC BLOOD PRESSURE: 132 MMHG | OXYGEN SATURATION: 97 % | HEIGHT: 65 IN | WEIGHT: 153 LBS | BODY MASS INDEX: 25.49 KG/M2 | DIASTOLIC BLOOD PRESSURE: 78 MMHG

## 2024-10-08 DIAGNOSIS — E55.9 VITAMIN D DEFICIENCY: Chronic | ICD-10-CM

## 2024-10-08 DIAGNOSIS — D47.2 MGUS (MONOCLONAL GAMMOPATHY OF UNKNOWN SIGNIFICANCE): Chronic | ICD-10-CM

## 2024-10-08 DIAGNOSIS — K57.90 DIVERTICULOSIS: Chronic | ICD-10-CM

## 2024-10-08 DIAGNOSIS — F42.2 MIXED OBSESSIONAL THOUGHTS AND ACTS: Chronic | ICD-10-CM

## 2024-10-08 DIAGNOSIS — Z00.00 ANNUAL PHYSICAL EXAM: Primary | ICD-10-CM

## 2024-10-08 DIAGNOSIS — R53.83 OTHER FATIGUE: ICD-10-CM

## 2024-10-08 DIAGNOSIS — M15.9 OSTEOARTHRITIS INVOLVING MULTIPLE JOINTS ON BOTH SIDES OF BODY: Chronic | ICD-10-CM

## 2024-10-08 DIAGNOSIS — E78.2 MIXED HYPERLIPIDEMIA: Chronic | ICD-10-CM

## 2024-10-08 PROCEDURE — 99396 PREV VISIT EST AGE 40-64: CPT | Performed by: NURSE PRACTITIONER

## 2024-10-08 NOTE — PROGRESS NOTES
Chief Complaint  Annual Exam     Subjective:      History of Present Illness {CC  Problem List  Visit  Diagnosis   Encounters  Notes  Medications  Labs  Result Review Imaging  Media :23}     Saray Green presents to [facility] for:  annual exam    MGUS  Followed by Dr Pérez:   LV: 9/5/24: note reviewed: no change: continue surveillance      Hyperlipidemia:   Chronic: continues lipitor (stopped over summer and has restarted)     Vitamin d deficiency: continues vitamin d supplement     OA  Prior treatment:  voltaren  Current treatment:  nabumetone     Depression: chronic  Continues prozac      Prior left foot fx and gained wt over last year.     Saray Cadet is here for coordination of medical care, to discuss health maintenance, disease prevention as well as to followup on medical problems.     Patient Care Team:  Verona Engel III, NP-C as PCP - General (Family Medicine)  Georgette, Alicia Perez MD as Consulting Physician (Obstetrics)  Juan C Parra MD as Consulting Physician (Neurology)  Jhonny Nova MD as Consulting Physician (Hematology and Oncology)  Delvis Schmitt MD as Consulting Physician (Otolaryngology)  Melva Chowdhury MD as Consulting Physician (Dermatology)  Daphne Tomlinson MD as Consulting Physician (Rheumatology)  Karla Caceres (Urogynecology)     Activity level is moderate.     Weight trend is     Health and Weight:   Weight trend is   Wt Readings from Last 4 Encounters:   10/08/24 69.4 kg (153 lb)   10/06/23 69.9 kg (154 lb)   04/04/23 70 kg (154 lb 6.4 oz)   09/20/22 65.4 kg (144 lb 3.2 oz)         Health Maintenance Female:    GYN: Georgette  Next gynecology appointment: 10/11/24  Next urogyno appointment: 1/13/25: [place]   Patient's last mammogram was   Last Completed Mammogram            MAMMOGRAM (Every 2 Years) Next due on 12/20/2025 12/20/2023  Mammo Diagnostic Bilateral With CAD    12/07/2020  SCANNED -  MAMMO    12/07/2020  Done - see media                   Mother: breast ca at 57 yo   Advised routine self-breast exams monthly.  Sexually active:   Pap smears have been:     Colon cancer screen:     Patient's last colonoscopy was   Last Completed Colonoscopy            COLORECTAL CANCER SCREENING (COLONOSCOPY - Every 10 Years) Next due on 7/20/2031 07/20/2021  SCANNED - COLONOSCOPY    07/13/2020  FECAL OCCULT BLOOD TEST (Done - see media - done at [facility] and will have colonoscopy 7/2021)                       Vaccines: declines today      Last eye exam: utd     Advised regular sunscreen.        I have reviewed patient's medical history, any new submitted information provided by patient or medical assistant and updated medical record.      Objective:      Physical Exam  Vitals reviewed.   Constitutional:       Appearance: Normal appearance. She is well-developed.   Neck:      Thyroid: No thyromegaly.   Cardiovascular:      Rate and Rhythm: Normal rate and regular rhythm.      Pulses: Normal pulses.      Heart sounds: Normal heart sounds.   Pulmonary:      Effort: Pulmonary effort is normal.      Breath sounds: Normal breath sounds.      Comments: E/U   Abdominal:      General: Bowel sounds are normal.   Musculoskeletal:         General: Normal range of motion.      Cervical back: Normal range of motion and neck supple.      Right lower leg: No edema.      Left lower leg: No edema.   Lymphadenopathy:      Cervical: No cervical adenopathy.   Skin:     General: Skin is warm and dry.      Capillary Refill: Capillary refill takes 2 to 3 seconds.   Neurological:      Mental Status: She is alert and oriented to person, place, and time.   Psychiatric:         Mood and Affect: Mood normal.         Behavior: Behavior normal. Behavior is cooperative.         Thought Content: Thought content normal.         Judgment: Judgment normal.        Result Review  Data Reviewed:{ Labs  Result Review  Imaging  Med Tab   "Media :23}     The following data was reviewed by: Verona Engel III, NP-C on 10/08/2024  Common labs          8/7/2024    10:51   Common Labs   Albumin 4.1       WBC 6.05       Hemoglobin 14.1       Hematocrit 39.6       Platelets 301          Details          This result is from an external source.                    Vital Signs:   /78 (BP Location: Left arm, Patient Position: Sitting, Cuff Size: Adult)   Pulse 92   Ht 165.1 cm (65\")   Wt 69.4 kg (153 lb)   SpO2 97%   BMI 25.46 kg/m²   Estimated body mass index is 25.46 kg/m² as calculated from the following:    Height as of this encounter: 165.1 cm (65\").    Weight as of this encounter: 69.4 kg (153 lb).        Requested Prescriptions      No prescriptions requested or ordered in this encounter       Routine medications provided by this office will also be refilled via pharmacy request.       Current Outpatient Medications:     atorvastatin (LIPITOR) 20 MG tablet, TAKE 1 TABLET BY MOUTH DAILY, Disp: 90 tablet, Rfl: 1    Cholecalciferol (Vitamin D) 50 MCG (2000 UT) capsule, Take 1 capsule by mouth Daily., Disp: 90 capsule, Rfl: 3    FLUoxetine (PROzac) 20 MG capsule, Take 1 capsule by mouth Daily., Disp: , Rfl:     nabumetone (RELAFEN) 750 MG tablet, Take 1 tablet by mouth., Disp: , Rfl:      Assessment and Plan:      Assessment and Plan {CC Problem List  Visit Diagnosis  ROS  Review (Popup)  Health Maintenance  Quality  BestPractice  Medications  SmartSets  SnapShot Encounters  Media :23}     Diagnoses and all orders for this visit:    1. Annual physical exam (Primary)    2. Mixed hyperlipidemia  Assessment & Plan:  Continue lipitor  - recheck lipids in one month.     Orders:  -     Lipid Panel With LDL / HDL Ratio; Future    3. Vitamin D deficiency  -     Vitamin D 25 hydroxy; Future    4. Osteoarthritis involving multiple joints on both sides of body  Assessment & Plan:  Continue relafen       5. " Depression/Anxiety  Overview:  Prior treatment:  cymbalta    Assessment & Plan:  Continue prozac       6. Other fatigue  -     TSH Rfx On Abnormal To Free T4; Future    7. MGUS (monoclonal gammopathy of unknown significance)  Overview:  Followed by Dr Pérez       8. Diverticulosis  Overview:  Advised life long high fiber diet                No orders of the defined types were placed in this encounter.    Will get labs in about a month as has not been consistent with statin.       Follow Up {Instructions Charge Capture  Follow-up Communications :23}     Return in about 1 year (around 10/8/2025) for Annual physical.      Patient was given instructions and counseling regarding her condition or for health maintenance advice. Please see specific information pulled into the AVS if appropriate.    Dragon disclaimer:   Much of this encounter note is an electronic transcription/translation of spoken language to printed text. The electronic translation of spoken language may permit erroneous, or at times, nonsensical words or phrases to be inadvertently transcribed; Although I have reviewed the note for such errors, some may still exist.     Additional Patient Counseling:       Patient Instructions   Diet:    Eat vegetables, fruits, whole grain, low-fat dairy, poultry, fish, beans, nontropical vegetable oils, and nuts, but avoid red meat (i.e., Mediterranean-style diet, DASH [Dietary Approaches to Stop Hypertension] diet).  Limit sugary drinks and sweets.  Limit saturated and trans fat to 5% to 6% of calories.  Limit sodium intake to 2,400 mg daily (about one teaspoon table salt [kosher/sea salt have less sodium per teaspoon]).    Weight loss / Calorie Counting Apps:    Lose It!   MyFitness Pal     Exercise:   Engage in moderate-to-vigorous aerobic activity for at least 40 minutes (on average) three to four times each week.    Wearables:   Activity tracker   Step tracker     Skin Care:   Use sun screen SPF >30  daily  Dermatologist for skin check regularly    Bone Health:   [email or web address]    Mental Health:       Vaccines:   Updated COVID booster: expected to be released around August 25th 2024: please contact local pharmacy if not available in office today.   Flu vaccine every fall  CDC recommends Flu vaccines for everyone 6 months and older EVERY season with rare exceptions.      COVID tests: https://covidtests.gov/

## 2024-10-08 NOTE — PATIENT INSTRUCTIONS
Diet:    Eat vegetables, fruits, whole grain, low-fat dairy, poultry, fish, beans, nontropical vegetable oils, and nuts, but avoid red meat (i.e., Mediterranean-style diet, DASH [Dietary Approaches to Stop Hypertension] diet).  Limit sugary drinks and sweets.  Limit saturated and trans fat to 5% to 6% of calories.  Limit sodium intake to 2,400 mg daily (about one teaspoon table salt [kosher/sea salt have less sodium per teaspoon]).    Weight loss / Calorie Counting Apps:    Lose It!   MyDocTree Pal     Exercise:   Engage in moderate-to-vigorous aerobic activity for at least 40 minutes (on average) three to four times each week.    Wearables:   Activity tracker   Step tracker     Skin Care:   Use sun screen SPF >30 daily  Dermatologist for skin check regularly    Bone Health:   [email or web address]    Mental Health:       Vaccines:   Updated COVID booster: expected to be released around August 25th 2024: please contact local pharmacy if not available in office today.   Flu vaccine every fall  CDC recommends Flu vaccines for everyone 6 months and older EVERY season with rare exceptions.      COVID tests: https://covidtests.gov/

## 2024-11-05 DIAGNOSIS — Z00.00 ANNUAL PHYSICAL EXAM: ICD-10-CM

## 2024-11-05 DIAGNOSIS — E55.9 VITAMIN D DEFICIENCY: Chronic | ICD-10-CM

## 2024-11-05 DIAGNOSIS — E78.2 MIXED HYPERLIPIDEMIA: Chronic | ICD-10-CM

## 2024-11-05 RX ORDER — MULTIVIT-MIN/IRON/FOLIC ACID/K 18-600-40
2000 CAPSULE ORAL DAILY
Qty: 90 CAPSULE | Refills: 3 | Status: SHIPPED | OUTPATIENT
Start: 2024-11-05

## 2024-11-05 RX ORDER — ATORVASTATIN CALCIUM 20 MG/1
20 TABLET, FILM COATED ORAL DAILY
Qty: 90 TABLET | Refills: 1 | Status: SHIPPED | OUTPATIENT
Start: 2024-11-05

## 2024-11-05 NOTE — TELEPHONE ENCOUNTER
Rx Refill Note  Requested Prescriptions     Pending Prescriptions Disp Refills    atorvastatin (LIPITOR) 20 MG tablet 90 tablet 1     Sig: Take 1 tablet by mouth Daily.    Cholecalciferol (Vitamin D) 50 MCG (2000 UT) capsule 90 capsule 3     Sig: Take 1 capsule by mouth Daily.      Last office visit with prescribing clinician: 10/8/2024  Next office visit with prescribing clinician: 10/10/2025         Viky Lanza MA  11/05/24, 09:14 EST

## 2024-12-30 ENCOUNTER — LAB (OUTPATIENT)
Facility: HOSPITAL | Age: 59
End: 2024-12-30
Payer: COMMERCIAL

## 2024-12-30 DIAGNOSIS — R53.83 OTHER FATIGUE: ICD-10-CM

## 2024-12-30 DIAGNOSIS — E78.2 MIXED HYPERLIPIDEMIA: Chronic | ICD-10-CM

## 2024-12-30 DIAGNOSIS — E55.9 VITAMIN D DEFICIENCY: Chronic | ICD-10-CM

## 2024-12-30 LAB
25(OH)D3 SERPL-MCNC: 33.6 NG/ML (ref 30–100)
CHOLEST SERPL-MCNC: 193 MG/DL (ref 0–200)
HDLC SERPL-MCNC: 57 MG/DL (ref 40–60)
LDLC SERPL CALC-MCNC: 108 MG/DL (ref 0–100)
LDLC/HDLC SERPL: 1.83 {RATIO}
TRIGL SERPL-MCNC: 159 MG/DL (ref 0–150)
TSH SERPL DL<=0.05 MIU/L-ACNC: 1.69 UIU/ML (ref 0.27–4.2)
VLDLC SERPL-MCNC: 28 MG/DL (ref 5–40)

## 2024-12-30 PROCEDURE — 36415 COLL VENOUS BLD VENIPUNCTURE: CPT

## 2024-12-30 PROCEDURE — 84443 ASSAY THYROID STIM HORMONE: CPT

## 2024-12-30 PROCEDURE — 80061 LIPID PANEL: CPT

## 2024-12-30 PROCEDURE — 82306 VITAMIN D 25 HYDROXY: CPT

## 2025-01-14 DIAGNOSIS — Z00.00 ANNUAL PHYSICAL EXAM: ICD-10-CM

## 2025-01-14 DIAGNOSIS — E55.9 VITAMIN D DEFICIENCY: Chronic | ICD-10-CM

## 2025-01-14 RX ORDER — MULTIVIT-MIN/IRON/FOLIC ACID/K 18-600-40
2000 CAPSULE ORAL DAILY
Qty: 90 CAPSULE | Refills: 3 | Status: SHIPPED | OUTPATIENT
Start: 2025-01-14 | End: 2025-01-20 | Stop reason: DRUGHIGH

## 2025-01-20 DIAGNOSIS — E55.9 VITAMIN D DEFICIENCY: ICD-10-CM

## 2025-01-20 RX ORDER — ERGOCALCIFEROL 1.25 MG/1
50000 CAPSULE, LIQUID FILLED ORAL
Qty: 13 CAPSULE | Refills: 1 | Status: SHIPPED | OUTPATIENT
Start: 2025-01-20

## 2025-04-21 ENCOUNTER — OFFICE VISIT (OUTPATIENT)
Dept: INTERNAL MEDICINE | Facility: CLINIC | Age: 60
End: 2025-04-21
Payer: COMMERCIAL

## 2025-04-21 VITALS
WEIGHT: 185 LBS | SYSTOLIC BLOOD PRESSURE: 122 MMHG | HEART RATE: 103 BPM | BODY MASS INDEX: 30.82 KG/M2 | OXYGEN SATURATION: 98 % | HEIGHT: 65 IN | DIASTOLIC BLOOD PRESSURE: 80 MMHG

## 2025-04-21 DIAGNOSIS — H66.90 ACUTE OTITIS MEDIA, UNSPECIFIED OTITIS MEDIA TYPE: Primary | ICD-10-CM

## 2025-04-21 PROCEDURE — 99213 OFFICE O/P EST LOW 20 MIN: CPT | Performed by: NURSE PRACTITIONER

## 2025-04-21 RX ORDER — METHYLPREDNISOLONE 4 MG/1
TABLET ORAL
Qty: 21 EACH | Refills: 0 | Status: SHIPPED | OUTPATIENT
Start: 2025-04-21

## 2025-04-21 RX ORDER — PANTOPRAZOLE SODIUM 40 MG/1
30 TABLET, DELAYED RELEASE ORAL
COMMUNITY
Start: 2024-12-10

## 2025-04-21 RX ORDER — DOXYCYCLINE 100 MG/1
100 CAPSULE ORAL 2 TIMES DAILY
Qty: 14 CAPSULE | Refills: 0 | Status: SHIPPED | OUTPATIENT
Start: 2025-04-21 | End: 2025-04-28

## 2025-04-21 NOTE — PROGRESS NOTES
"        Chief Complaint  Earache (Left ear started a week ago )     Subjective:      History of Present Illness {CC  Problem List  Visit  Diagnosis   Encounters  Notes  Medications  Labs  Result Review Imaging  Media :23}     Saray Green presents to [facility] PRIMARY [facility] for:      Earache   There is pain in the left ear. This is a new problem. The current episode started 1 to 4 weeks ago. The problem occurs constantly. The problem has been unchanged. There has been no fever. She has tried antibiotics for the symptoms. The treatment provided mild relief.   Prior treatment:  Augmentin last Friday     Left : popping     She is flying tomorrow.       Also: weight has increased over the winter.   She inquired with message this am - has not checked with insurance to see if glp1s covered. Also - states she is not sure she'd want to take as she has chronic reflux symptoms.         I have reviewed patient's medical history, any new submitted information provided by patient or medical assistant and updated medical record.      Objective:      Physical Exam  HENT:      Right Ear: Ear canal normal.      Left Ear: A middle ear effusion is present. Tympanic membrane is injected and erythematous.      Ears:      Comments: Can not clear left with valsalva   Lymphadenopathy:      Cervical: No cervical adenopathy.        Result Review  Data Reviewed:{ Labs  Result Review  Imaging  Med Tab  Media :23}                Vital Signs:   /80 (BP Location: Left arm, Patient Position: Sitting, Cuff Size: Adult)   Pulse 103   Ht 165.1 cm (65\")   Wt 83.9 kg (185 lb)   SpO2 98%   BMI 30.79 kg/m²   Estimated body mass index is 30.79 kg/m² as calculated from the following:    Height as of this encounter: 165.1 cm (65\").    Weight as of this encounter: 83.9 kg (185 lb).        Requested Prescriptions     Signed Prescriptions Disp Refills    doxycycline (Vibramycin) 100 MG capsule 14 capsule 0     " Sig: Take 1 capsule by mouth 2 (Two) Times a Day for 7 days.    methylPREDNISolone (MEDROL) 4 MG dose pack 21 each 0     Sig: Take as directed on package instructions.       Routine medications provided by this office will also be refilled via pharmacy request.       Current Outpatient Medications:     atorvastatin (LIPITOR) 20 MG tablet, Take 1 tablet by mouth Daily., Disp: 90 tablet, Rfl: 1    FLUoxetine (PROzac) 20 MG capsule, Take 1 capsule by mouth Daily., Disp: , Rfl:     nabumetone (RELAFEN) 750 MG tablet, Take 1 tablet by mouth., Disp: , Rfl:     pantoprazole (PROTONIX) 40 MG EC tablet, 30 tablets., Disp: , Rfl:     vitamin D (ERGOCALCIFEROL) 1.25 MG (97375 UT) capsule capsule, Take 1 capsule by mouth Every 7 (Seven) Days., Disp: 13 capsule, Rfl: 1    doxycycline (Vibramycin) 100 MG capsule, Take 1 capsule by mouth 2 (Two) Times a Day for 7 days., Disp: 14 capsule, Rfl: 0    methylPREDNISolone (MEDROL) 4 MG dose pack, Take as directed on package instructions., Disp: 21 each, Rfl: 0     Assessment and Plan:      Assessment and Plan {CC Problem List  Visit Diagnosis  ROS  Review (Popup)  Health Maintenance  Quality  BestPractice  Medications  SmartSets  SnapShot Encounters  Media :23}     Diagnoses and all orders for this visit:    1. Acute otitis media, unspecified otitis media type (Primary)  -     doxycycline (Vibramycin) 100 MG capsule; Take 1 capsule by mouth 2 (Two) Times a Day for 7 days.  Dispense: 14 capsule; Refill: 0  -     methylPREDNISolone (MEDROL) 4 MG dose pack; Take as directed on package instructions.  Dispense: 21 each; Refill: 0      Will change augmentin to doxycycline.   + medrol dose pack and advised to use over-the-counter afrin prior to flight tomorrow.          New Medications Ordered This Visit   Medications    doxycycline (Vibramycin) 100 MG capsule     Sig: Take 1 capsule by mouth 2 (Two) Times a Day for 7 days.     Dispense:  14 capsule     Refill:  0     methylPREDNISolone (MEDROL) 4 MG dose pack     Sig: Take as directed on package instructions.     Dispense:  21 each     Refill:  0             Follow Up {Instructions Charge Capture  Follow-up Communications :23}     Return if symptoms worsen or fail to improve.      Patient was given instructions and counseling regarding her condition or for health maintenance advice. Please see specific information pulled into the AVS if appropriate.    Dragon disclaimer:   Much of this encounter note is an electronic transcription/translation of spoken language to printed text. The electronic translation of spoken language may permit erroneous, or at times, nonsensical words or phrases to be inadvertently transcribed; Although I have reviewed the note for such errors, some may still exist.     Additional Patient Counseling:       There are no Patient Instructions on file for this visit.

## 2025-07-18 ENCOUNTER — TRANSCRIBE ORDERS (OUTPATIENT)
Dept: ADMINISTRATIVE | Facility: HOSPITAL | Age: 60
End: 2025-07-18
Payer: COMMERCIAL

## 2025-07-18 DIAGNOSIS — K21.9 GASTROESOPHAGEAL REFLUX DISEASE WITHOUT ESOPHAGITIS: Primary | ICD-10-CM

## 2025-07-18 DIAGNOSIS — R13.19 OTHER DYSPHAGIA: ICD-10-CM

## 2025-07-28 ENCOUNTER — HOSPITAL ENCOUNTER (OUTPATIENT)
Dept: GENERAL RADIOLOGY | Facility: HOSPITAL | Age: 60
Discharge: HOME | End: 2025-07-28
Payer: COMMERCIAL

## 2025-07-28 DIAGNOSIS — K21.9 GASTROESOPHAGEAL REFLUX DISEASE WITHOUT ESOPHAGITIS: ICD-10-CM

## 2025-07-28 DIAGNOSIS — R13.19 OTHER DYSPHAGIA: ICD-10-CM

## 2025-07-28 PROCEDURE — 74230 X-RAY XM SWLNG FUNCJ C+: CPT

## 2025-07-28 PROCEDURE — 92611 MOTION FLUOROSCOPY/SWALLOW: CPT

## 2025-07-28 RX ADMIN — BARIUM SULFATE 4 ML: 980 POWDER, FOR SUSPENSION ORAL at 11:00

## 2025-07-28 RX ADMIN — BARIUM SULFATE 5 ML: 400 PASTE ORAL at 11:00

## 2025-07-28 RX ADMIN — BARIUM SULFATE 50 ML: 400 SUSPENSION ORAL at 11:04

## 2025-07-28 RX ADMIN — BARIUM SULFATE 55 ML: 0.81 POWDER, FOR SUSPENSION ORAL at 11:00

## 2025-07-28 NOTE — MBS/VFSS/FEES
Outpatient Speech Language Pathology   Adult Swallow Initial Evaluation  [facility]     Patient Name: Saray Green  : 1965  MRN: [id number]  Today's Date: 2025         Visit Date: 2025   Patient Active Problem List   Diagnosis    Other complicated headache syndrome    Migraine without aura and without status migrainosus, not intractable    Family history of aneurysm    Hyperlipidemia    B12 deficiency    Osteoarthritis of foot    Muscle pain    Arthritis    Vitamin D deficiency    Autoimmune disease    Encounter for long-term (current) drug use    Allergic rhinitis    Degenerative disc disease at L5-S1 level    Non-ischemic cardiomyopathy    Osteoarthritis involving multiple joints on both sides of body    Other insomnia    Depression/Anxiety    Gastroesophageal reflux disease without esophagitis    Diverticulosis    MGUS (monoclonal gammopathy of unknown significance)        Past Medical History:   Diagnosis Date    Allergic 1974    spring seasonal    Cardiomyopathy     0CT    OFF MEDS AFTER RESOLVED   HOLTER MONITOR 2016    Colon polyp     Heart palpitations     COMES AND GOES  24 HOLTER DONE 16   NO RESULTS YET    History of migraine     HL (hearing loss)     Tinnitus    Hyperlipidemia     MGUS (monoclonal gammopathy of unknown significance)     Migraine     Osteoarthritis     Seasonal allergies     Shoulder pain, right         Past Surgical History:   Procedure Laterality Date    ANKLE SURGERY      COLONOSCOPY      D & C HYSTEROSCOPY WITH NOVASURE ENDOMETRIAL ABLATION AND MYOSURE      DILATATION AND CURETTAGE      COUPLE    FRACTURE SURGERY      KNEE ARTHROSCOPY      AGE 11    SHOULDER ACROMIOPLASTY Left     X 2    SHOULDER ARTHROSCOPY Right 2016    Procedure: RT SHOULDER ARTHROSCOPY WITH ACROMIOPLSTY, LABRAL DEBRIDEMENT  AND DISTAL CLAVICLE EXCISION;  Surgeon: Rikki Morel MD;  Location: [facility];  Service:          Visit Dx:     ICD-10-CM  ICD-9-CM   1. Gastroesophageal reflux disease without esophagitis  K21.9 530.81   2. Other dysphagia  R13.19 787.29            OP SLP Assessment/Plan - 07/28/25 1140          SLP Assessment    Functional Problems Swallowing  -BB    Clinical Impression: Swallowing WNL  -BB    Please refer to paper survey for additional self-reported information Yes  -BB    Please refer to items scanned into chart for additional diagnostic informaiton and handouts as provided by clinician Yes  -BB              User Key  (r) = Recorded By, (t) = Taken By, (c) = Cosigned By      Initials Name Provider Type    BB Lai James, SLP Speech and Language Pathologist                     SLP Adult Swallow Evaluation       Row Name 07/28/25 1100       Rehab Evaluation    Document Type evaluation  -BB    Subjective Information no complaints  -BB    Patient Observations alert;cooperative;agree to therapy  -BB    Patient Effort good  -BB    Symptoms Noted During/After Treatment none  -BB       General Information    Patient Profile Reviewed yes  -BB    Pertinent History Of Current Problem 58 yo referred by ENT for OP VFSS. Pt reports occasional coughing episodes during meals. Onset approx. 1 year ago. Pt with hx of GERD and esophagitis for which she takes PPI. Pt had empiric dilation 12/24.  -BB    Current Method of Nutrition regular textures;thin liquids  -BB    Precautions/Limitations, Vision WFL;for purposes of eval  -BB    Precautions/Limitations, Hearing WFL;for purposes of eval  -BB    Prior Level of Function-Communication WFL  -BB    Prior Level of Function-Swallowing no diet consistency restrictions  -BB    Plans/Goals Discussed with patient;agreed upon  -BB    Barriers to Rehab none identified  -BB    Patient's Goals for Discharge eat/drink without coughing/choking  -BB       Pain    Pretreatment Pain Rating 0/10 - no pain  -BB    Posttreatment Pain Rating 0/10 - no pain  -BB       Oral Motor Structure and Function    Dentition Assessment  natural, present and adequate  -BB       General Eating/Swallowing Observations    Eating/Swallowing Skills self-fed  -BB    Positioning During Eating upright 90 degree;upright in chair  -BB       MBS/VFSS    Utensils Used spoon;cup  -BB    Consistencies Trialed regular textures;thin liquids;nectar/syrup-thick liquids;honey-thick liquids;pudding thick  -BB       MBS/VFSS Interpretation    VFSS Summary Oropharyngeal swallow is WFL. No poli laryngeal penetration and no poli tracheal aspiration observed. Cursory view of the esophagus notable for mild esophageal retention, which was not observed to clear within 60 seconds and/or with liquid wash.  -BB       MBSImP Score    MBSImP Score Completed? Yes  -BB    Materials presented per Standard Protocol? Yes  -BB       Oral Impairment Domain    Component 1- Lip Closure 0: No labial escape  difficult to assess 2/2 logistical reasons. Appears unemarkable.  -BB    Component 2- Tongue Control During Bolus Hold 0: Cohesive bolus between tongue to palatal seal  -BB    Component 3- Bolus Prep/Mastication 0: Timely and efficient chewing and mashing  -BB    Component 4- Bolus Transport/Lingual Motion 0: Brisk tongue motion  -BB    Component 5- Oral Residue 1: Trace residue lining oral structures  -BB    Component 6- Initiation of Pharyngeal Swallow 1: Bolus head in valleculae  -BB       Pharyngeal Impairment Domain    Component 7- Soft Palate Elevation 0: No bolus between soft palate (SP)/pharyngeal wall (PW)  -BB    Component 8- Laryngeal Elevation 0: Complete superior movement of thyroid cartilage with complete approximation of arytenoids to epiglottic petiole  -BB    Component 9- Anterior Hyoid Excursion 0: Complete anterior movement  -BB    Component 10- Epiglottic Movement 0: Complete inversion  -BB    Component 11- Laryngeal Vestibular Closure- Height of Swallow 0: Complete- no air/contrast in laryngeal vestibule  -BB    Component 12- Pharyngeal Stripping Wave 1: Present-  "diminished  -BB    Component 13- Pharyngeal Contraction (A/P View Only) 0: Complete  appears L preferential flow  -BB    Component 14- PE Segment Opening 1: Partial distension/partial duration- partial obstruction of flow  -BB    Component 15- Tongue Base Retraction 1: Trace column of contrast or air between TB and PW  -BB    Component 16- Pharyngeal Residue 1: Trace residue within or on pharyngeal structures  -BB       Esophageal Impairment Domain    Component 17- Esophageal Clearance (Upright Position) 1: Esophageal retention  -BB       SLP Communication to Radiology    Severity Level of Dysphagia suspected esophageal dysfunction;WFL  -BB    Summary Statement MISAEL Delgado, present. Oropharyngeal swallow is WFL. No poli laryngeal penetration and no poli tracheal aspiration observed. Cursory view of the esophagus notable for mild esophageal retention, which was not observed to clear within 60 seconds and/or with liquid wash.  -BB       SLP Evaluation Clinical Impression    SLP Swallowing Diagnosis swallow WFL/no suspected pharyngeal impairment;suspected esophageal dysphagia  -BB       Recommendations    Therapy Frequency (Swallow) evaluation only  -BB    SLP Diet Recommendation regular textures;thin liquids  -BB    Recommended Precautions and Strategies general aspiration precautions;alternate between small bites of food and sips of liquid  -BB    SLP Rec. for Method of Medication Administration as tolerated  -BB    Monitor for Signs of Aspiration notify SLP if any concerns  -BB    Demonstrates Need for Referral to Another Service other (see comments)  follow up with ENT and GI regarding results of this VFSS for consideration of \"silent\" reflux and esophageal motility assessment  -BB              User Key  (r) = Recorded By, (t) = Taken By, (c) = Cosigned By      Initials Name Provider Type    Lai Carrasco, SLP Speech and Language Pathologist                                   OP SLP Education       Row " Name 07/28/25 1141       Education    Barriers to Learning No barriers identified  -BB    Education Provided Described results of evaluation;Patient expressed understanding of evaluation  -BB    Assessed Learning needs;Learning motivation;Learning preferences;Learning readiness  -BB    Learning Motivation Strong  -BB    Learning Method Explanation;Demonstration;Written materials  -BB    Teaching Response Verbalized understanding  -BB              User Key  (r) = Recorded By, (t) = Taken By, (c) = Cosigned By      Initials Name Effective Dates    BB Lai James SLP 02/19/23 -                                Time Calculation:   SLP Start Time: 1015  SLP Stop Time: 1145  SLP Time Calculation (min): 90 min  Untimed Charges  SLP Eval/Re-eval : ST Motion Fluoro Eval Swallow - 59188  [id number]-OE Motion Fluoro Eval Swallow Minutes: 90  Total Minutes  Untimed Charges Total Minutes: 90   Total Minutes: 90    Therapy Charges for Today       Code Description Service Date Service Provider Modifiers Qty    71552062592 HC ST MOTION FLUORO EVAL SWALLOW 6 7/28/2025 Lai James, SLP GN 1                     RAFI Mendoza  7/28/2025

## 2025-07-28 NOTE — MBS/VFSS/FEES
Outpatient Speech Language Pathology   Adult Swallow Initial Evaluation  [facility]     Patient Name: Saray Green  : 1965  MRN: [id number]  Today's Date: 2025         Visit Date: 2025   Patient Active Problem List   Diagnosis    Other complicated headache syndrome    Migraine without aura and without status migrainosus, not intractable    Family history of aneurysm    Hyperlipidemia    B12 deficiency    Osteoarthritis of foot    Muscle pain    Arthritis    Vitamin D deficiency    Autoimmune disease    Encounter for long-term (current) drug use    Allergic rhinitis    Degenerative disc disease at L5-S1 level    Non-ischemic cardiomyopathy    Osteoarthritis involving multiple joints on both sides of body    Other insomnia    Depression/Anxiety    Gastroesophageal reflux disease without esophagitis    Diverticulosis    MGUS (monoclonal gammopathy of unknown significance)        Past Medical History:   Diagnosis Date    Allergic 1974    spring seasonal    Cardiomyopathy     0CT    OFF MEDS AFTER RESOLVED   HOLTER MONITOR 2016    Colon polyp     Heart palpitations     COMES AND GOES  24 HOLTER DONE 16   NO RESULTS YET    History of migraine     HL (hearing loss)     Tinnitus    Hyperlipidemia     MGUS (monoclonal gammopathy of unknown significance)     Migraine     Osteoarthritis     Seasonal allergies     Shoulder pain, right         Past Surgical History:   Procedure Laterality Date    ANKLE SURGERY      COLONOSCOPY      D & C HYSTEROSCOPY WITH NOVASURE ENDOMETRIAL ABLATION AND MYOSURE      DILATATION AND CURETTAGE      COUPLE    FRACTURE SURGERY      KNEE ARTHROSCOPY      AGE 11    SHOULDER ACROMIOPLASTY Left     X 2    SHOULDER ARTHROSCOPY Right 2016    Procedure: RT SHOULDER ARTHROSCOPY WITH ACROMIOPLSTY, LABRAL DEBRIDEMENT  AND DISTAL CLAVICLE EXCISION;  Surgeon: Rikki Morel MD;  [place] [facility] [place] [facility] [place]  Service:          Visit Dx:     ICD-10-CM  Patient tolerated procedure without complication. No complaints of pain. Dressing CDI. AVS reviewed with patient. All questions answered at this time. Patient ambulating at baseline and left department with family members.   ICD-9-CM   1. Gastroesophageal reflux disease without esophagitis  K21.9 530.81   2. Other dysphagia  R13.19 787.29                SLP Adult Swallow Evaluation       Row Name 07/28/25 1100       Rehab Evaluation    Document Type evaluation  -BB    Subjective Information no complaints  -BB    Patient Observations alert;cooperative;agree to therapy  -BB    Patient Effort good  -BB    Symptoms Noted During/After Treatment none  -BB       General Information    Patient Profile Reviewed yes  -BB    Pertinent History Of Current Problem 60 yo referred by ENT for OP VFSS. Pt reports occasional coughing episodes during meals. Onset approx. 1 year ago. Pt with hx of GERD and esophagitis for which she takes PPI. Pt had empiric dilation 12/24.  -BB    Current Method of Nutrition regular textures;thin liquids  -BB    Precautions/Limitations, Vision WFL;for purposes of eval  -BB    Precautions/Limitations, Hearing WFL;for purposes of eval  -BB    Prior Level of Function-Communication WFL  -BB    Prior Level of Function-Swallowing no diet consistency restrictions  -BB    Plans/Goals Discussed with patient;agreed upon  -BB    Barriers to Rehab none identified  -BB    Patient's Goals for Discharge eat/drink without coughing/choking  -BB       Pain    Pretreatment Pain Rating 0/10 - no pain  -BB    Posttreatment Pain Rating 0/10 - no pain  -BB       Oral Motor Structure and Function    Dentition Assessment natural, present and adequate  -BB       General Eating/Swallowing Observations    Eating/Swallowing Skills self-fed  -BB    Positioning During Eating upright 90 degree;upright in chair  -BB       MBS/VFSS    Utensils Used spoon;cup  -BB    Consistencies Trialed regular textures;thin liquids;nectar/syrup-thick liquids;honey-thick liquids;pudding thick  -BB       MBS/VFSS Interpretation    VFSS Summary Oropharyngeal swallow is WFL. No poli laryngeal penetration and no poli tracheal aspiration observed. Cursory view of the  esophagus notable for mild esophageal retention, which was not observed to clear within 60 seconds and/or with liquid wash.  -BB       MBSImP Score    MBSImP Score Completed? Yes  -BB    Materials presented per Standard Protocol? Yes  -BB       Oral Impairment Domain    Component 1- Lip Closure 0: No labial escape  difficult to assess 2/2 logistical reasons. Appears unemarkable.  -BB    Component 2- Tongue Control During Bolus Hold 0: Cohesive bolus between tongue to palatal seal  -BB    Component 3- Bolus Prep/Mastication 0: Timely and efficient chewing and mashing  -BB    Component 4- Bolus Transport/Lingual Motion 0: Brisk tongue motion  -BB    Component 5- Oral Residue 1: Trace residue lining oral structures  -BB    Component 6- Initiation of Pharyngeal Swallow 1: Bolus head in valleculae  -BB       Pharyngeal Impairment Domain    Component 7- Soft Palate Elevation 0: No bolus between soft palate (SP)/pharyngeal wall (PW)  -BB    Component 8- Laryngeal Elevation 0: Complete superior movement of thyroid cartilage with complete approximation of arytenoids to epiglottic petiole  -BB    Component 9- Anterior Hyoid Excursion 0: Complete anterior movement  -BB    Component 10- Epiglottic Movement 0: Complete inversion  -BB    Component 11- Laryngeal Vestibular Closure- Height of Swallow 0: Complete- no air/contrast in laryngeal vestibule  -BB    Component 12- Pharyngeal Stripping Wave 1: Present- diminished  -BB    Component 13- Pharyngeal Contraction (A/P View Only) 0: Complete  appears L preferential flow  -BB    Component 14- PE Segment Opening 1: Partial distension/partial duration- partial obstruction of flow  -BB    Component 15- Tongue Base Retraction 1: Trace column of contrast or air between TB and PW  -BB    Component 16- Pharyngeal Residue 1: Trace residue within or on pharyngeal structures  -BB       Esophageal Impairment Domain    Component 17- Esophageal Clearance (Upright Position) 1: Esophageal  "retention  -BB       SLP Communication to Radiology    Severity Level of Dysphagia suspected esophageal dysfunction;WFL  -BB    Summary Statement MISAEL Delgado, present. Oropharyngeal swallow is WFL. No poli laryngeal penetration and no poli tracheal aspiration observed. Cursory view of the esophagus notable for mild esophageal retention, which was not observed to clear within 60 seconds and/or with liquid wash.  -BB       SLP Evaluation Clinical Impression    SLP Swallowing Diagnosis swallow WFL/no suspected pharyngeal impairment;suspected esophageal dysphagia  -BB       Recommendations    Therapy Frequency (Swallow) evaluation only  -BB    SLP Diet Recommendation regular textures;thin liquids  -BB    Recommended Precautions and Strategies general aspiration precautions;alternate between small bites of food and sips of liquid  -BB    SLP Rec. for Method of Medication Administration as tolerated  -BB    Monitor for Signs of Aspiration notify SLP if any concerns  -BB    Demonstrates Need for Referral to Another Service other (see comments)  follow up with ENT and GI regarding results of this VFSS for consideration of \"silent\" reflux and esophageal motility assessment  -BB              User Key  (r) = Recorded By, (t) = Taken By, (c) = Cosigned By      Initials Name Provider Type    Lai Carrasco, SLP Speech and Language Pathologist                    Education provided regarding results/recs of this VFSS. Pt verbalized understanding. All questions answered.                            Time Calculation:                     RAFI Mendoza  7/28/2025  "